# Patient Record
Sex: FEMALE | Race: BLACK OR AFRICAN AMERICAN
[De-identification: names, ages, dates, MRNs, and addresses within clinical notes are randomized per-mention and may not be internally consistent; named-entity substitution may affect disease eponyms.]

---

## 2022-07-29 ENCOUNTER — HOSPITAL ENCOUNTER (EMERGENCY)
Dept: HOSPITAL 97 - ER | Age: 57
Discharge: LEFT BEFORE DISCHARGE | End: 2022-07-29
Payer: COMMERCIAL

## 2022-07-29 DIAGNOSIS — Z02.9: Primary | ICD-10-CM

## 2022-07-29 NOTE — ER
Nurse's Notes                                                                                     

 [facility]                                                                 

Name: Shadi Fall                                                                               

Age: 56 yrs                                                                                       

Sex: Female                                                                                       

: 1965                                                                                   

MRN: [id number]                                                                                   

Arrival Date: 2022                                                                          

Time: 06:43                                                                                       

Account#: [id number]                                                                            

Bed Waiting                                                                                       

Private MD:                                                                                       

Diagnosis:                                                                                        

                                                                                                  

Assessment:                                                                                       

                                                                                             

06:55 Reassessment: Pt told registration they were going to go to urgent care instead.        vc1 

                                                                                                  

ED Course:                                                                                        

06:43 Patient arrived in ED.                                                                  bp1 

                                                                                                  

Administered Medications:                                                                         

No medications were administered                                                                  

                                                                                                  

                                                                                                  

Outcome:                                                                                          

06:56 Patient left the ED.                                                                    vc1 

                                                                                                  

Signatures:                                                                                       

Shania Segovia                           bp1                                                  

Vanessa Aguirre, RN                    RN   vc1                                                  

                                                                                                  

**************************************************************************************************

## 2023-04-25 ENCOUNTER — HOSPITAL ENCOUNTER (EMERGENCY)
Dept: HOSPITAL 97 - ER | Age: 58
Discharge: HOME | End: 2023-04-25
Payer: COMMERCIAL

## 2023-04-25 VITALS — TEMPERATURE: 98.5 F

## 2023-04-25 VITALS — OXYGEN SATURATION: 98 % | SYSTOLIC BLOOD PRESSURE: 141 MMHG | DIASTOLIC BLOOD PRESSURE: 87 MMHG

## 2023-04-25 DIAGNOSIS — R33.9: Primary | ICD-10-CM

## 2023-04-25 DIAGNOSIS — Z98.890: ICD-10-CM

## 2023-04-25 PROCEDURE — 81003 URINALYSIS AUTO W/O SCOPE: CPT

## 2023-04-25 PROCEDURE — 99281 EMR DPT VST MAYX REQ PHY/QHP: CPT

## 2023-04-25 NOTE — ER
Nurse's Notes                                                                                     

 [facility]                                                                 

Name: Shadi Fall                                                                               

Age: 57 yrs                                                                                       

Sex: Female                                                                                       

: 1965                                                                                   

MRN: [id number]                                                                                   

Arrival Date: 2023                                                                          

Time: 14:15                                                                                       

Account#: [id number]                                                                            

Bed 3                                                                                             

Private MD:                                                                                       

Diagnosis: Retention of urine, unspecified                                                        

                                                                                                  

Presentation:                                                                                     

                                                                                             

14:46 Chief complaint: Chief complaint: Patient states: she had a echo seal procedure on      ap3 

      23, and her urinary catheter was removed yesterday 2023. patient         

      states that at first she was able to urinate, however now she can not, and she feels a      

      lot of pressure.                                                                            

14:46 Coronavirus screen: At this time, the client does not indicate any symptoms associated  ap3 

      with coronavirus-19. Ebola Screen: No symptoms or risks identified at this time.            

      Initial Sepsis Screen: Does the patient meet any 2 criteria? HR > 90 bpm. Does the          

      patient have a suspected source of infection? Yes: Dysuria/Frequency/Urgency/UTI. Risk      

      Assessment: Do you want to hurt yourself or someone else? Patient reports no desire to      

      harm self or others. Onset of symptoms was 2023.                                  

14:46 Method Of Arrival: Ambulatory                                                           ap3 

14:46 Acuity: MICHELA 3                                                                           ap3 

                                                                                                  

Triage Assessment:                                                                                

14:49 General: Appears uncomfortable, Behavior is calm, cooperative, appropriate for age.     ap3 

      Pain: Complains of pain in suprapubic area, right lower quadrant and left lower             

      quadrant Quality of pain is described as pressure. Pain: Pain currently is 9 out of 10      

      on a pain scale. Pain began gradually. Neuro: Level of Consciousness is awake, alert,       

      obeys commands, Oriented to person, place, time, situation. Cardiovascular: Patient's       

      skin is warm and dry. Respiratory: Airway is patent Respiratory effort is even,             

      unlabored, Respiratory pattern is regular, symmetrical. : Reports inability to void,      

      since progressively got worse this morning.                                                 

                                                                                                  

Historical:                                                                                       

- Allergies:                                                                                      

14:49 No Known Allergies;                                                                     ap3 

- PMHx:                                                                                           

14:49 None;                                                                                   ap3 

- PSHx:                                                                                           

14:49 bladder lift; hysterectomy-;                                                        ap3 

                                                                                                  

- Immunization history:: Client reports receiving the 2nd dose of the Covid vaccine.              

- Social history:: Smoking status: Patient denies any tobacco usage or history of.                

                                                                                                  

                                                                                                  

Screenin:51 Abuse screen: Denies threats or abuse. Nutritional screening: No deficits noted.        ap3 

      Tuberculosis screening: No symptoms or risk factors identified.                             

                                                                                                  

Assessment:                                                                                       

17:59 Reassessment: Patient appears in no apparent distress at this time. Patient and/or      iw  

      family updated on plan of care and expected duration. Pain level reassessed. Patient is     

      alert, oriented x 3, equal unlabored respirations, skin warm/dry/pink.                      

                                                                                                  

Vital Signs:                                                                                      

14:46  / 105; Pulse 121; Resp 17; Temp 98.5; Pulse Ox 100% ; Weight 72.57 kg; Height 5  ap3 

      ft. 5 in. ; Pain 9/10;                                                                      

17:59  / 87; Pulse 97; Resp 16; Pulse Ox 98% ;                                          iw  

14:46 Body Mass Index 26.63 (72.57 kg, 165.1 cm)                                              ap3 

14:46 Pain Scale: Adult                                                                       ap3 

                                                                                                  

ED Course:                                                                                        

14:17 Patient arrived in ED.                                                                  rg4 

14:34 Daniel Barry DO is Attending Physician.                                                ms3 

14:49 Triage completed.                                                                       ap3 

14:50 Arm band placed on right wrist.                                                         ap3 

17:58 Marilyn Martinez, RN is Primary Nurse.                                                   iw  

17:59 Patient did not have IV access during this emergency room visit.                        iw  

                                                                                                  

Administered Medications:                                                                         

No medications were administered                                                                  

                                                                                                  

                                                                                                  

Medication:                                                                                       

17:59 VIS not applicable for this client.                                                     iw  

                                                                                                  

Outcome:                                                                                          

17:32 Discharge ordered by MD.                                                                ms3 

18:00 Patient left the ED.                                                                    iw  

                                                                                                  

Signatures:                                                                                       

Marilyn Mratinez, JED ADKINS   iw                                                   

Perla Murphy                                 rg4                                                  

Nery Lawrence RN RN   ap3                                                  

Daniel Barry DO                        DO   ms3                                                  

                                                                                                  

Corrections: (The following items were deleted from the chart)                                    

14:49 14:46 Chief complaint: ap3                                                              ap3 

                                                                                                  

**************************************************************************************************

## 2023-04-25 NOTE — XMS REPORT
Continuity of Care Document

                            Created on:2023



Patient:LUIS M HELTON

Sex:Female

:1965

External Reference #:[id number]





Demographics







                          Address                   [address]



                                                    [place] [address]

 

                          Home Phone                [phone]

 

                          Mobile Phone              [phone]

 

                          Email Address             [email or web address]

 

                          Preferred Language        English

 

                          Marital Status            Unknown

 

                          Holiness Affiliation     Unknown

 

                          Race                      Unknown

 

                          Additional Race(s)        Unavailable



                                                    Unavailable



                                                    Black or 

 

                          Ethnic Group              Unknown









Author







                          Organization              [facility]

t

 

                          Address                   [address]



                                                    [place] [address]

 

                          Phone                     [phone]









Support







                Name            Relationship    Address         Phone

 

                RAYNE COLLIER               Unavailable     [phone]

 

                PIYUSH HELTON  P               [phone]      [phone]



                                                [place] [id number] 

 

                SID STANFORD MOM             [address]      [phone]

[phone]



                                                [place] [address] 

 

                3               Unavailable     [address] Unavailable



                                                [place] [address] 

 

                1               [place]         [address]      Unavailable



                                                [place] [address] 

 

                2               [place]         [address]      Unavailable



                                                [place] [address] 









Care Team Providers







                    Name                Role                Phone

 

                    Pcp MD, Jannie          Primary Care Physician Unavailable

 

                    RAKESH TELLEZ Attending Clinician Unavailable

 

                    MUSTAPHA LUCIA  Attending Clinician Unavailable

 

                    DELIA GREEN  Attending Clinician Unavailable

 

                    PAT ADKINS  Attending Clinician Unavailable

 

                    RIVAS KEARNS Attending Clinician Unavailable

 

                    MUSTAPHA LUCIA Attending Clinician Unavailable

 

                    Mustapha Lucia MD Attending Clinician Unavailable

 

                    Dulce Maria Rausch MD  Attending Clinician [phone]

 

                    Alex Chavez Attending Clinician [phone]

 

                    RAYNE GTZ       Attending Clinician Unavailable

 

                    MILA HUERTA     Attending Clinician Unavailable

 

                    [facility]    Attending Clinician Unavailable

 

                    LYNN WASHINGTON       Attending Clinician Unavailable

 

                    MAC, EKG-           Attending Clinician Unavailable

 

                    LAKESHA MACIAS Attending Clinician Unavailable

 

                    LAB[facility]               Attending Clinician Unavailable

 

                    JAMIA SIDHU     Attending Clinician Unavailable

 

                    RANDEE WERNER Attending Clinician Unavailable

 

                    MD DOTTIE  Attending Clinician Unavailable

 

                    CLAIRE SCHUSTER  Attending Clinician Unavailable

 

                    Corrina Wagoner MD Attending Clinician [phone]

[phone]

 

                    CORRINA WAGONER Attending Clinician Unavailable

 

                    Jonathan Nicole DO    Attending Clinician [phone]

 

                    SHYANNE CARVAJAL  Attending Clinician Unavailable

 

                    REJI ENGLISH Attending Clinician Unavailable

 

                    Mary Prince MD Attending Clinician [phone]

 

                    MARY PRINCE   Attending Clinician Unavailable

 

                    MARTA CADENA      Attending Clinician Unavailable

 

                    Rayne Gtz MD    Attending Clinician [phone]

 

                    JEREMY FIGUEROA       Attending Clinician Unavailable

 

                    Clarissa Shipley MD Attending Clinician [phone]

 

                    JESUS ORDONEZ       Attending Clinician Unavailable

 

                    Mp Martin MD Attending Clinician [phone]

 

                    LOYD HANDLEY        Attending Clinician Unavailable

 

                    MUSTAPHA LUCIA Admitting Clinician Unavailable









Payers







           Payer Name Policy Type Policy Number Effective Date Expiration Date S

ko

 

           [facility]          [id number] 2019            



           [place]                       00:00:00              



           CAPITATED)                                             

 

           [facility]              [id number] 2019            



           BLUE/ESSENTIALS                       00:00:00              

 

           [facility]            [id number] 2016            



                                            00:00:00              







Problems







       Condition Condition Condition Status Onset  Resolution Last   Treating Co

mments 

Source



       Name   Details Category        Date   Date   Treatment Clinician        



                                                 Date                 

 

       Cystocele Cystocele Disease Active                              [facility]



       with   with                 4[facility]20                               [facility]



       prolapse prolapse               00:00:                             [facility]

l



                                   00                                 Center

 

       Synovial Synovial Disease Active                              [facility]

y



       cyst of cyst of               3-28                               [facility]



       left   left                 00:00:                             -



       popliteal popliteal               00                                 Exte

rna



       space  space                                                   l

 

       Chondromal Chondromal Disease Active                              K

el[facility]



       acia   acia                 3-28                               [facility]



       patellae patellae               00:00:                             -



       of left of left               00                                 Extern[facility]



       knee   knee                                                    l

 

       Ocular Ocular Disease Active 2013                             M[facility]a



       migraine migraine               0-08                               [facility]

d



                                   00:00:                             -



                                   00                                 Externa



                                                                      l

 

       Migraine Migraine Disease Active                              [facility]

y



                                   1-30                               [facility]



                                   00:00:                             -



                                   00                                 Externa



                                                                      l

 

       Elevated Elevated Disease Active                              [facility]

y



       blood  blood                1-14                               [facility]



       pressure pressure               00:00:                             -



                                   00                                 Externa



                                                                      l







Allergies, Adverse Reactions, Alerts







       Allergy Allergy Status Severity Reaction(s) Onset  Inactive Treating Comm

ents 

Source



       Name   Type                        Date   Date   Clinician        

 

       NO KNOWN Allergy Active                                           [facility]



       ALLERGIE                                                         



       S                                                              







Social History







           Social Habit Start Date Stop Date  Quantity   Comments   Source

 

           Gender identity 2019-10-24            Identifies as            Martha

 Shari



                      22:57:25              female gender            - External



                                            (finding)             

 

           Sexual orientation 2019-10-24            Heterosexual            [facility]

lindsay Shannonybmuna



                      22:57:25              (finding)             - External

 

           History SDOH                                             Martha aguilar



           Alcohol Frequency                                             - Exter

nal

 

           History SDOH                                             Martha aguilar



           Alcohol Std Drinks                                             - Exte

rnal

 

           History SDOH                                             Martha aguilar



           Alcohol Binge                                             - External

 

           Exposure to 2023-04-10 2023 Not sure              [facility]



           SARS-CoV-2 (event) 00:00:00   06:09:00                         [facility]

[facility]

 

           Alcohol intake 2023 Ex-drinker            [facility]

es



                      00:00:00   00:00:00   (finding)             [facility]

 

           Tobacco use and 2023 Smokeless tobacco            [facility]

roney [facility]



           exposure   00:00:00   00:00:00   non-user              - External

 

           History of Social 2022                       Martha [facility]



           function   00:00:00   00:00:00                         - External

 

           Alcohol Comment 2019 OCCASIONAL : Q 3            Gigi vazquez [facility]



                      00:00:00   00:00:00   WEEKS                 - External

 

           Sex Assigned At 1965 F                     POONAM Jeffrey



           Birth      00:00:00   00:00:00                         [facility]









                Smoking Status  Start Date      Stop Date       Source

 

                Never smoked tobacco                                 [facility]







Medications







       Ordered Filled Start  Stop   Current Ordering Indication Dosage Frequency

 Signature

                    Comments            Components          Source



     Medication Medication Date Date Medication? Clinician                (SIG) 

          



     Name Name                                                   

 

     Multiple            Yes                      Take by           Martha



     Vitamins-Mi      4-24                               mouth           [facility]



     nerals      15:20:                                              -



     (MULTIVITAM      29                                                Externa



     IN ADULTS                                                        l



     OR)                                                         

 

     omeprazole            Yes            20mg QD   Take 1           [facility]

t



     (PriLOSEC)      4-22                               capsule           [facility]



     20 MG      14:15:                               (20 mg           Medical



     capsule      01                                 total) by           Center



                                                  mouth in           



                                                  the            



                                                  morning.           

 

     rizatriptan            Yes            10mg      Take 1           [facility]



     (MAXALT-MLT      4-22                               tablet (10           [facility]

kes



     ) 10 MG      14:15:                               mg total)           Med[facility]

al



     disintegrat      01                                 by mouth           Cent

er



     ing tablet                                         as needed           



                                                  for            



                                                  Migraine           



                                                  May repeat           



                                                  in 2 hours           



                                                  if needed.           

 

     hydrOXYzine      0      Yes            25mg      Take 1           [facility]



     (ATARAX) 25      4-22                               tablet (25           [facility]

kes



     MG tablet      14:15:                               mg total)           Med

ical



               01                                 by mouth 3           Center



                                                  (three)           



                                                  times           



                                                  daily as           



                                                  needed for           



                                                  Itching.           

 

     albuterol      -0      Yes            .63mg      Take 3 mLs           C

HI St



     (ACCUNEB)      4-21                               (0.63 mg           [facility]



     0.63 mg/3      14:15:                               total) by           Med

ical



     mL        53                                 nebulizati           Center



     nebulizer                                         on every 6           



     solution                                         (six)           



                                                  hours as           



                                                  needed for           



                                                  Wheezing.           

 

     azithromyci      0      Yes            500mg QD   Take 1           [facility]



     n         4-21                               tablet           [facility]



     (ZITHROMAX)      14:15:                               (500 mg           Med

ical



     500 MG      53                                 total) by           Center



     tablet                                         mouth in           



                                                  the            



                                                  morning.           

 

     codeine-gua      0      Yes            5mL       Take 5 mLs           

[facility]



     ifenesin      4-21                               by mouth 3           [facility]



     (GUAIFENESI      14:15:                               (three)           Med

ical



     N AC)      53                                 times           Center



      mg/5                                         daily as           



     mL liquid                                         needed for           



                                                  Cough.           

 

     predniSONE      0      Yes            1mg  QD   Take 1           [facility]

t



     (DELTASONE)      4-21                               tablet (1           [facility]

es



     1 MG tablet      14:15:                               mg total)           M

edical



               53                                 by mouth           Center



                                                  in the           



                                                  morning.           

 

     Acetaminoph      0      Yes            1{tbl} Q4H  Take 1           Ke

lsey



     en-Codeine      4-21                               tablet by           [facility]



     (TYLENOL/CO      00:00:                               mouth           -



     DEINE #3)      00                                 every 4           Externa



     300-30 MG                                         hours as           l



     oral Tablet                                         needed for           



                                                  pain           

 

     Docusate      -0      Yes       [id number] 100mg      Take 1           Ke

lsey



     Sodium 100      4-21                               capsule           [facility]

d



     MG oral      00:00:                               (100 mg           -



     Capsule      00                                 total) by           Externa



                                                  mouth 2           l



                                                  times           



                                                  daily           

 

     TRIMETHOPRI      -0 - Yes            1{tbl}      Take 1           K

elsey



     M-SULFAMETH      4-21 04-27                          tablet by           Se mendoza



     OXAZOLE      00:00: 04:59                          mouth           -



     (Bactrim      00   :00                           every 12           Externa



     DS) 800-160                                         hours for           l



     MG oral                                         5 days           



     Tablet                                                        

 

     traZODone      2023- No             100mg QD   Take 1           [facility]



     (DESYREL)      4-20 04-20                          tablet           [facility]



     100 MG      06:09: 00:00                          (100 mg           Medical



     tablet      34   :00                           total) by           Center



                                                  mouth           



                                                  nightly.           

 

     Omeprazole            Yes            20mg      Take 1           [facility]

y



     20 MG oral      4-17                               capsule           [facility]

d



     Delayed      00:00:                               (20 mg           -



     Release      00                                 total) by           Externa



     Capsule                                         mouth           l



                                                  daily           

 

     hydroCHLORO            Yes       [id number]           TAKE 1           

Martha



     thiazide      4-11                               CAPSULE(12           [facility]

ld



     12.5 MG      00:00:                               .5 MG) BY           -



     oral      00                                 MOUTH           Externa



     Capsule                                         DAILY           l

 

     IRON OR      2023- No                       Take by           Martha



               4-10 04-10                          mouth           [facility]



               15:29: 00:00                                         -



               37   :00                                          Externa



                                                                 l

 

     Multiple            Yes                      Take by           [facility]a



     Vitamins-Mi      4-10                               mouth           [facility]



     nerals      15:29:                                              -



     (MULTIVITAM      36                                                Externa



     IN ADULTS                                                        l



     OR)                                                         

 

     hydroCHLORO            Yes       [id number] 12.5mg      Take 1         

  Martha



     thiazide      4-10                               capsule           [facility]



     12.5 MG      00:00:                               (12.5 mg           -



     oral      00                                 total) by           Externa



     Capsule                                         mouth           l



                                                  daily           

 

     Multiple            Yes                      Take by           Martha



     Vitamins-Mi      4-06                               mouth           [facility]



     nerals      10:37:                                              -



     (MULTIVITAM      18                                                Externa



     IN ADULTS                                                        l



     OR)                                                         

 

     IRON OR            Yes                      Take by           Martha



               4-06                               mouth           [facility]



               10:37:                                              -



               18                                                Externa



                                                                 l

 

     Multiple            Yes                      Take by           Martha



     Vitamins-Mi      3-28                               mouth           [facility]



     nerals      13:33:                                              -



     (MULTIVITAM      31                                                Externa



     IN ADULTS                                                        l



     OR)                                                         

 

     IRON OR            Yes                      Take by           Martha



               3-28                               mouth           [facility]



               13:33:                                              -



               31                                                Externa



                                                                 l

 

     Multiple            Yes                      Take by           Martha



     Vitamins-Mi      1-20                               mouth           [facility]



     nerals      10:29:                                              -



     (MULTIVITAM      03                                                Externa



     IN ADULTS                                                        l



     OR)                                                         

 

     IRON OR            Yes                      Take by           Martha



               1-20                               mouth           [facility]



               10:29:                                              -



               03                                                Externa



                                                                 l

 

     Multiple      2022      Yes                      Take by           Martha



     Vitamins-Mi      0-11                               mouth           [facility]



     nerals      13:50:                                              -



     (MULTIVITAM      07                                                Externa



     IN ADULTS                                                        l



     OR)                                                         

 

     IRON OR      2022      Yes                      Take by           Martha



               0-11                               mouth           [facility]



               13:50:                                              -



               07                                                Externa



                                                                 l

 

     Docusate      2022      Yes       [id number] 100mg      Take 1           [facility]

lsey



     Sodium 100      0-11                               capsule           [facility]

d



     MG oral      00:00:                               (100 mg           -



     Capsule      00                                 total) by           Externa



                                                  mouth 2           l



                                                  times           



                                                  daily           

 

     Polyethylen      2022      Yes       [id number] 17g       Take 17 g        

   [facility]



     e Glycol      0-11                               by mouth           [facility]



     3350      00:00:                               daily           -



     (MiraLax)      00                                                [facility]



     17 g oral                                                        l



     Pack                                                        

 

     Lidocaine 5      2022      Yes       [id number] 1{patch      Place 1      

     [facility]



     % apply      0-11                     }         patch onto           [facility]

d



     externally      00:00:                               the skin           -



     Patch      00                                 every 24           [facility]



                                                  hours           l



                                                  Remove &           



                                                  Discard           



                                                  patch           



                                                  within 12           



                                                  hours or           



                                                  as             



                                                  directed           



                                                  by MD Hall      2022      Yes       [id number] 100mg      Take 1           [facility]

[facility]



     Sodium 100      0-11                               capsule           [facility]

d



     MG oral      00:00:                               (100 mg           -



     Capsule      00                                 total) by           [facility]tern[facility]



                                                  mouth 2           l



                                                  times           



                                                  daily           

 

     Polyethylen      2022      Yes       [id number] 17g       Take 17 g        

   [facility]



     e Glycol      0-11                               by mouth           [facility]



     3350      00:00:                               daily           -



     (MiraLax)      00                                                Extern[facility]



     17 g oral                                                        l



     Pack                                                        

 

     Lidocaine 5      2022      Yes       [id number]           UNWRAP AND      

     [facility]



     % apply      0-11                               APPLY 1           [facility]



     externally      00:00:                               PATCH TO           -



     Patch      00                                 SKIN EVERY           Externa



                                                  24 HOURS;           l



                                                  REMOVE AND           



                                                  DISCARD           



                                                  PATCH           



                                                  WITHIN 12           



                                                  HOURS OR           



                                                  AS             



                                                  DIRECTED           



                                                  BY MD Hall      2022      Yes       [id number] 100mg      Take 1           [facility]

lsey



     Sodium 100      0-11                               capsule           [facility]

d



     MG oral      00:00:                               (100 mg           -



     Capsule      00                                 total) by           [facility]tern[facility]



                                                  mouth 2           l



                                                  times           



                                                  daily           

 

     Polyethylen      2022      Yes       [id number] 17g       Take 17 g        

   [facility]



     e Glycol      0-11                               by mouth           [facility]



     3350      00:00:                               daily           -



     (MiraLax)      00                                                Extern[facility]



     17 g oral                                                        l



     Pack                                                        

 

     Lidocaine 5      2022      Yes       [id number]           UNWRAP AND      

     [facility]



     % apply      0-11                               APPLY 1           [facility]



     externally      00:00:                               PATCH TO           -



     Patch      00                                 SKIN EVERY           Externa



                                                  24 HOURS;           l



                                                  REMOVE AND           



                                                  DISCARD           



                                                  PATCH           



                                                  WITHIN 12           



                                                  HOURS OR           



                                                  AS             



                                                  DIRECTED           



                                                  BY MD Hall      2022      Yes       [id number] 100mg      Take 1           [facility]

lsey



     Sodium 100      0-11                               capsule           [facility]

d



     MG oral      00:00:                               (100 mg           -



     Capsule      00                                 total) by           [facility]



                                                  mouth 2           l



                                                  times           



                                                  daily           

 

     Polyethylen      2022      Yes       [id number] 17g       Take 17 g        

   [facility]



     e Glycol      0-11                               by mouth           [facility]



     3350      00:00:                               daily           -



     (MiraLax)      00                                                Extern[facility]



     17 g oral                                                        l



     Pack                                                        

 

     Lidocaine 2022      Yes       [id number]           UNWRAP AND      

     [facility]



     % apply      0-11                               APPLY 1           [facility]



     externally      00:00:                               PATCH TO           -



     Patch      00                                 SKIN EVERY           Externa



                                                  24 HOURS;           l



                                                  REMOVE AND           



                                                  DISCARD           



                                                  PATCH           



                                                  WITHIN 12           



                                                  HOURS OR           



                                                  AS             



                                                  DIRECTED           



                                                  BY MD Hall      2022      Yes       [id number] 100mg      Take 1           [facility]

lsey



     Sodium 100      0-11                               capsule           [facility]

d



     MG oral      00:00:                               (100 mg           -



     Capsule      00                                 total) by           [facility]



                                                  mouth 2           l



                                                  times           



                                                  daily           

 

     Lidocaine 2022      Yes       [id number]           UNWRAP AND      

     [facility]



     % apply      0-11                               APPLY 1           [facility]



     externally      00:00:                               PATCH TO           -



     Patch      00                                 SKIN EVERY           [facility]tern[facility]



                                                  24 HOURS;           l



                                                  REMOVE AND           



                                                  DISCARD           



                                                  PATCH           



                                                  WITHIN 12           



                                                  HOURS OR           



                                                  AS             



                                                  DIRECTED           



                                                  BY MD           

 

     Lidocaine 2022      Yes       [id number]           UNWRAP AND      

     [facility]



     % apply      0-11                               APPLY 1           [facility]



     externally      00:00:                               PATCH TO           -



     Patch      00                                 SKIN EVERY           Externa



                                                  24 HOURS;           l



                                                  REMOVE AND           



                                                  DISCARD           



                                                  PATCH           



                                                  WITHIN 12           



                                                  HOURS OR           



                                                  AS             



                                                  DIRECTED           



                                                  BY MD Barbour      2022- No        [id number] 17g       Take 17 g       

    [facility]



     e Glycol      0-11 04-10                          by mouth           [facility]

d



     3350      00:00: 00:00                          daily           -



     (MiraLax)      00   :00                                          [facility]



     17 g oral                                                        l



     Pack                                                        

 

     Cephalexin      2022- No        [id number] 500mg      Take 1       

    [facility]



     (Keflex)      8-02 10-11           [id number]           capsule           [facility]

ld



     500 MG oral      00:00: 00:00                          (500 mg           -



     Capsule      00   :00                           total) by           [facility]tern[facility]



                                                  mouth 2           l



                                                  times           



                                                  daily           

 

     Mupirocin      2022- No        [id number]           Apply 1        

   [facility]



     (BACTROBAN)      8-02 10-11           [id number]           applicatio          

 [facility]



     2 % apply      00:00: 00:00                          n              -



     externally      00   :00                           topically           Ex[facility]

rna



     Ointment                                         3 times           l



                                                  daily           

 

     hydrOXYzine      -0      Yes            25mg Q.[id number] Take 1         

  [facility]



     HCl 25 MG      6-13                          [id number] tablet (25         

  [facility]



     oral Tablet      00:00:                          3D   mg total)           -



               00                                 by mouth           [facility]



                                                  every 8           l



                                                  hours as           



                                                  needed for           



                                                  itching           

 

     hydrOXYzine      -0      Yes            25mg Q.[id number] Take 1         

  [facility]



     HCl 25 MG      6-13                          [id number] tablet (25         

  [facility]



     oral Tablet      00:00:                          3D   mg total)           -



               00                                 by mouth           [facility]



                                                  every 8           l



                                                  hours as           



                                                  needed for           



                                                  itching           

 

     hydrOXYzine      -0      Yes            25mg Q.[id number] Take 1         

  [facility]



     HCl 25 MG      6-13                          [id number] tablet (25         

  [facility]



     oral Tablet      00:00:                          3D   mg total)           -



               00                                 by mouth           [facility]



                                                  every 8           l



                                                  hours as           



                                                  needed for           



                                                  itching           

 

     hydrOXYzine      -0      Yes            25mg Q.[id number] Take 1         

  [facility]



     HCl 25 MG      6-13                          [id number] tablet (25         

  [facility]



     oral Tablet      00:00:                          3D   mg total)           -



               00                                 by mouth           [facility]



                                                  every 8           l



                                                  hours as           



                                                  needed for           



                                                  itching           

 

     hydrOXYzine      -0      Yes            25mg Q.[id number] Take 1         

  [facility]



     HCl 25 MG      6-13                          [id number] tablet (25         

  [facility]



     oral Tablet      00:00:                          3D   mg total)           -



               00                                 by mouth           [facility]



                                                  every 8           l



                                                  hours as           



                                                  needed for           



                                                  itching           

 

     hydrOXYzine      -0      Yes            25mg Q.[id number] Take 1         

  [facility]



     HCl 25 MG      6-13                          [id number] tablet (25         

  [facility]



     oral Tablet      00:00:                          3D   mg total)           -



               00                                 by mouth           [facility]



                                                  every 8           l



                                                  hours as           



                                                  needed for           



                                                  itching           

 

     diazePAM      2-0 - No             2mg  Q.25D Take 1           [facility]

y



     (Valium) 2      5-06 10-11                          tablet (2           [facility]

[facility]



     MG oral      00:00: 00:00                          mg total)           -



     Tablet      00   :00                           by mouth           [facility]



                                                  every 6           l



                                                  hours as           



                                                  needed for           



                                                  anxiety           



                                                  Take           



                                                  tablet 30           



                                                  minutes           



                                                  before MRI           

 

     Multiple      -0      Yes                      Take by           [facility]



     Vitamins-Mi      5-03                               mouth           [facility]



     nerals      15:48:                                              



     (MULTIVITAM      41                                                



     IN ADULTS                                                        



     OR)                                                         

 

     IRON OR            Yes                      Take by           [facility]



               5-03                               mouth           [facility]



               15:48:                                              



               41                                                

 

     Rizatriptan            Yes                      May repeat           

[facility]



     Benzoate      5-03                               in 2 hours           [facility]

ld



     (Maxalt) 10      00:00:                               if needed           



     MG oral      00                                                



     Tablet                                                        

 

     Rizatriptan      0      Yes                      May repeat           

[facility]



     Benzoate      5-03                               in 2 hours           [facility]

ld



     (Maxalt) 10      00:00:                               if needed           -



     MG oral      00                                                [facility]tern[facility]



     Tablet                                                        l

 

     Rizatriptan      0      Yes                      May repeat           

[facility]



     Benzoate      5-03                               in 2 hours           [facility]

ld



     (Maxalt) 10      00:00:                               if needed           -



     MG oral      00                                                [facility]tern[facility]



     Tablet                                                        l

 

     Rizatriptan            Yes                      May repeat           

[facility]



     Benzoate      5-03                               in 2 hours           [facility]

ld



     (Maxalt) 10      00:00:                               if needed           -



     MG oral      00                                                Extern[facility]



     Tablet                                                        l

 

     Rizatriptan            Yes                      May repeat           

[facility]



     Benzoate      5-03                               in 2 hours           [facility]

ld



     (Maxalt) 10      00:00:                               if needed           -



     MG oral      00                                                Extern[facility]



     Tablet                                                        l

 

     Rizatriptan      0      Yes                      May repeat           

[facility]



     Benzoate      5-03                               in 2 hours           [facility]

ld



     (Maxalt) 10      00:00:                               if needed           -



     MG oral      00                                                Extern[facility]



     Tablet                                                        l

 

     Rizatriptan      0      Yes                      May repeat           

[facility]



     Benzoate      5-03                               in 2 hours           [facility]

ld



     (Maxalt) 10      00:00:                               if needed           -



     MG oral      00                                                Extern[facility]



     Tablet                                                        l

 

     Rizatriptan      0 - No                       May repeat          

 [facility]



     Benzoate      1-28 05-03                          in 2 hours           [facility]

old



     (Maxalt) 10      00:00: 00:00                          if needed           



     MG oral      00   :00                                          



     Tablet                                                        

 

     Multiple      2021      Yes                      Take by           [facility]



     Vitamins-Mi      1-30                               mouth           [facility]



     nerals      14:04:                                              



     (MULTIVITAM      09                                                



     IN ADULTS                                                        



     OR)                                                         

 

     IRON OR      2021      Yes                      Take by           [facility]



               1-30                               mouth           [facility]



               14:04:                                              



               09                                                

 

     Rizatriptan      2021      Yes                      May repeat           

[facility]



     Benzoate      1-12                               in 2 hours           [facility]

ld



     (Maxalt) 10      00:00:                               if needed           



     MG oral      00                                                



     Tablet                                                        

 

     Multiple            Yes                      Take by           [facility]



     Vitamins-Mi                                     mouth           [facility]



     nerals      10:03:                                              



     (MULTIVITAM      32                                                



     IN ADULTS                                                        



     OR)                                                         

 

     IRON OR            Yes                      Take by           [facility]



                                              mouth           [facility]



               10:03:                                              



               32                                                

 

     Omeprazole      -0      Yes            20mg      Take 1           [facility]

y



     20 MG oral      5-14                               capsule           [facility]

d



     Delayed      00:00:                               (20 mg           



     Release      00                                 total) by           



     Capsule                                         mouth           



                                                  daily           

 

     Omeprazole      -0      Yes            20mg      Take 1           [facility]

y



     20 MG oral      5-14                               capsule           [facility]

d



     Delayed      00:00:                               (20 mg           -



     Release      00                                 total) by           [facility]



     Capsule                                         mouth           l



                                                  daily           

 

     Omeprazole      -0      Yes            20mg      Take 1           [facility]

y



     20 MG oral      5-14                               capsule           [facility]

d



     Delayed      00:00:                               (20 mg           -



     Release                                       total) by           [facility]



     Capsule                                         mouth           l



                                                  daily           

 

     Omeprazole      -0      Yes            20mg      Take 1           [facility]

y



     20 MG oral      5-14                               capsule           [facility]

d



     Delayed      00:00:                               (20 mg           -



     Release                                       total) by           [facility]



     Capsule                                         mouth           l



                                                  daily           

 

     Omeprazole      -0      Yes            20mg      Take 1           [facility]

y



     20 MG oral      5-14                               capsule           [facility]

d



     Delayed      00:00:                               (20 mg           -



     Release                                       total) by           [facility]



     Capsule                                         mouth           l



                                                  daily           

 

     Omeprazole      -0      Yes            20mg      Take 1           [facility]

y



     20 MG oral      5-14                               capsule           [facility]

d



     Delayed      00:00:                               (20 mg           -



     Release                                       total) by           [facility]



     Capsule                                         mouth           l



                                                  daily           

 

     Omeprazole      -0      Yes            20mg      Take 1           [facility]

y



     20 MG oral      5-14                               capsule           [facility]

d



     Delayed      00:00:                               (20 mg           



     Release      00                                 total) by           



     Capsule                                         mouth           



                                                  daily           

 

     Omeprazole      -0      Yes            20mg      Take 1           [facility]

y



     20 MG oral      5-14                               capsule           [facility]

d



     Delayed      00:00:                               (20 mg           



     Release      00                                 total) by           



     Capsule                                         mouth           



                                                  daily           

 

     Fluticasone      -      Yes            1{puff}      Inhale 1          

 [facility]



     -Salmeterol      3-18                               puff into           [facility]

[facility]



     (Advair      00:00:                               the lungs           



     Diskus)      00                                 2 times           



     250-50                                         daily           



     MCG/DOSE                                                        



     inhalation                                                        



     AEROSOL                                                        



     POWDER,                                                        



     BREATH                                                        



     ACTIVATED                                                        

 

     Fluticasone            Yes            1{puff}      Inhale 1          

 [facility]



     -Salmeterol      3-18                               puff into           [facility]

[facility]



     (Advair      00:00:                               the lungs           



     Diskus)      00                                 2 times           



     250-50                                         daily           



     MCG/DOSE                                                        



     inhalation                                                        



     AEROSOL                                                        



     POWDER,                                                        



     BREATH                                                        



     ACTIVATED                                                        

 

     Fluticasone            Yes            1{puff}      Inhale 1          

 [facility]



     -Salmeterol      3-18                               puff into           [facility]

[facility]



     (Advair      00:00:                               the lungs           



     Diskus)      00                                 2 times           



     250-50                                         daily           



     MCG/DOSE                                                        



     inhalation                                                        



     AEROSOL                                                        



     POWDER,                                                        



     BREATH                                                        



     ACTIVATED                                                        

 

     Rizatriptan            Yes                      May repeat           

[facility]



     Benzoate      2-04                               in 2 hours           [facility]

ld



     (Maxalt) 10      00:00:                               if needed           



     MG oral Tab      00                                                

 

     hydrOXYzine      -0      Yes            25mg [id number] Take 1         

  [facility]



     HCl 25 MG      4-27                          [id number] tablet (25         

  [facility]



     oral Tab      00:00:                          3D   mg total)           



               00                                 by mouth           



                                                  every 8           



                                                  hours as           



                                                  needed for           



                                                  itching           

 

     hydrOXYzine      -0      Yes            25mg Q8H  Take 1           [facility]

ey



     HCl 25 MG      4-27                               tablet (25           [facility]

old



     oral Tab      00:00:                               mg total)           



               00                                 by mouth           



                                                  every 8           



                                                  hours as           



                                                  needed for           



                                                  itching           

 

     hydrOXYzine      -0      Yes            25mg Q8H  Take 1           [facility]

ey



     HCl 25 MG      4-27                               tablet (25           [facility]

old



     oral Tab      00:00:                               mg total)           



               00                                 by mouth           



                                                  every 8           



                                                  hours as           



                                                  needed for           



                                                  itching           

 

     albuterol            Yes            1{ampul      Take 1           [facility]



     (ACCUNEB)      7-25                     e}        ampule by           [facility]



     0.63 mg/3      14:18:                               nebulizati           Me

dical



     mL        59                                 on every 6           Center



     nebulizer                                         (six)           



     solution                                         hours as           



                                                  needed for           



                                                  Wheezing.           

 

     azithromyci            Yes            500mg QD   Take 500           [facility]

[facility]



     n         7-25                               mg by           [facility]



     (ZITHROMAX)      14:18:                               mouth           [facility]

al



     500 MG      59                                 daily.           Center



     tablet                                                        

 

     codeine-gua            Yes            5mL       Take 5 mLs           

[facility]



     ifenesin      7-25                               by mouth 3           [facility]



     (GUAIFENESI      14:18:                               (three)           Med

ical



     N AC)      59                                 times           Center



      mg/5                                         daily as           



     mL liquid                                         needed for           



                                                  Cough.           

 

     predniSONE            Yes            1mg  QD   Take 1 mg           [facility]



     (DELTASONE)      7-25                               by mouth           [facility]

s



     1 MG tablet      14:18:                               daily.           Medi

felix



               59                                                [place]

 

     traZODone            Yes            100mg QD   Take 100           [facility]



     (DESYREL)      7-25                               mg by           [facility]



     100 MG      14:18:                               mouth           [facility]



     tablet      59                                 nightly.           [facility]

 

     Albuterol            Yes       [id number] 2{puff} Q.25D Inhale 2    

       [facility]



      (90      7-24                6              (two)           [facility]



     Base)      00:00:                               puffs into           



     MCG/ACT IN      00                                 the lungs           



     AERS                                         every 6           



                                                  hours as           



                                                  needed           

 

     Albuterol            Yes       [id number] 2{puff} Q.25D Inhale 2    

       [facility]



      (90      7-24                6              (two)           [facility]



     Base)      00:00:                               puffs into           -



     MCG/ACT IN      00                                 the lungs           Exte

rna



     AERS                                         every 6           l



                                                  hours as           



                                                  needed           

 

     Albuterol            Yes       [id number] 2{puff} Q.25D Inhale 2    

       [facility]



      (90      7-24                6              (two)           [facility]



     Base)      00:00:                               puffs into           -



     MCG/ACT IN      00                                 the lungs           Exte

rna



     AERS                                         every 6           l



                                                  hours as           



                                                  needed           

 

     Albuterol            Yes       [id number] 2{puff} Q.25D Inhale 2    

       [facility]



      (90      7-24                6              (two)           [facility]



     Base)      00:00:                               puffs into           -



     MCG/ACT IN      00                                 the lungs           Exte

rna



     AERS                                         every 6           l



                                                  hours as           



                                                  needed           

 

     Albuterol      -      Yes       [id number] 2{puff} Q.25D Inhale 2    

       [facility]



      (90      7-24                6              (two)           [facility]



     Base)      00:00:                               puffs into           -



     MCG/ACT IN      00                                 the lungs           Exte

rna



     AERS                                         every 6           l



                                                  hours as           



                                                  needed           

 

     Albuterol      -      Yes       [id number] 2{puff} Q.25D Inhale 2    

       [facility]



      (90      7-24                6              (two)           [facility]



     Base)      00:00:                               puffs into           -



     MCG/ACT IN      00                                 the lungs           Exte

rna



     AERS                                         every 6           l



                                                  hours as           



                                                  needed           

 

     Albuterol      -      Yes       [id number] 2{puff} Q6H  Inhale 2     

      [facility]



      (90      7-24                6              (two)           [facility]



     Base)      00:00:                               puffs into           



     MCG/ACT IN      00                                 the lungs           



     AERS                                         every 6           



                                                  hours as           



                                                  needed           

 

     Albuterol            Yes       [id number] 2{puff} Q.25D Inhale 2    

       [facility]



      (90      7-24                6              (two)           [facility]



     Base)      00:00:                               puffs into           -



     MCG/ACT IN      00                                 the lungs           Exte

rna



     AERS                                         every 6           l



                                                  hours as           



                                                  needed           

 

     Albuterol            Yes       [id number] 2{puff} Q6H  Inhale 2     

      [facility]



      (90      7-24                6              (two)           [facility]



     Base)      00:00:                               puffs into           



     MCG/ACT IN      00                                 the lungs           



     AERS                                         every 6           



                                                  hours as           



                                                  needed           







Immunizations







           Ordered Immunization Filled Immunization Date       Status     Commen

ts   Source



           Name       Name                                        

 

           Shingles IM            2023 Completed             [facility]

d



           (Shingrix)            00:00:00                         - External

 

           Shingles IM            2022-10-11 Completed             [facility]

d



           (Shingrix)            00:00:00                         - External

 

           Influenza Virus            2022-10-11 Completed             [facility]

[facility]



           Vaccine, age 6            00:00:00                         - External



           months and up                                             

 

           Shingles IM            2022-10-11 Completed             [facility]

d



           (Shingrix)            00:00:00                         - External

 

           Influenza Virus            2022-10-11 Completed             [facility]

[facility]



           Vaccine, age 6            00:00:00                         - External



           months and up                                             

 

           Shingles IM            2022-10-11 Completed             [facility]

d



           (Shingrix)            00:00:00                         - External

 

           Influenza Virus            2022-10-11 Completed             [facility]

[facility]



           Vaccine, age 6            00:00:00                         - External



           months and up                                             

 

           Shingles IM            2022-10-11 Completed             [facility]

d



           (Shingrix)            00:00:00                         - External

 

           Influenza Virus            2022-10-11 Completed             [facility] Se

[facility]



           Vaccine, age 6            00:00:00                         - External



           months and up                                             

 

           Shingles IM            2022-10-11 Completed             [facility]

d



           (Shingrix)            00:00:00                         - External

 

           Influenza Virus            2022-10-11 Completed             [facility] Se

[facility]



           Vaccine, age 6            00:00:00                         - External



           months and up                                             

 

           Shingles IM            2022-10-11 Completed             [facility]

d



           (Shingrix)            00:00:00                         - External

 

           Influenza Virus            2022-10-11 Completed             [facility] Se

[facility]



           Vaccine, age 6            00:00:00                         - External



           months and up                                             

 

           Influenza Virus            2021 Completed             [facility]a Se

[facility]



           Vaccine, age 6            00:00:00                         



           months and up                                             

 

           Influenza Virus            2021 Completed             [facility] Se

[facility]



           Vaccine, age 6            00:00:00                         - External



           months and up                                             

 

           Influenza Virus            2021 Completed             [facility] Se

[facility]



           Vaccine, age 6            00:00:00                         - External



           months and up                                             

 

           Influenza Virus            2021 Completed             [facility] Se

[facility]



           Vaccine, age 6            00:00:00                         - External



           months and up                                             

 

           Influenza Virus            2021 Completed             [facility] Se

[facility]



           Vaccine, age 6            00:00:00                         - External



           months and up                                             

 

           Influenza Virus            2021 Completed             [facility]a Se

[facility]



           Vaccine, age 6            00:00:00                         - External



           months and up                                             

 

           Influenza Virus            2021 Completed             M[facility]a Se

[facility]



           Vaccine, age 6            00:00:00                         - External



           months and up                                             

 

           Influenza Virus            2021 Completed             [facility] Se

[facility]



           Vaccine, age 6            00:00:00                         



           months and up                                             

 

           Influenza Virus            2021 Completed             [facility]a Se

[facility]



           Vaccine, age 6            00:00:00                         



           months and up                                             

 

           Covid-19 Vaccine            2021 Completed             [facility]a [facility]ry



           Moderna (Spikevax),            00:00:00                         



           Mrna-lnp, Erwin                                             



           Protein, Pf                                             

 

           Covid-19 Vaccine            2021 Completed             [facility]



           Moderna (Spikevax),            00:00:00                         - Ext

ernal



           Mrna-lnp, Erwin                                             



           Protein, Pf                                             

 

           Covid-19 Vaccine            2021 Completed             [facility]



           Moderna (Spikevax),            00:00:00                         - Ext

ernal



           Mrna-lnp, Erwin                                             



           Protein, Pf                                             

 

           Covid-19 Vaccine            2021 Completed             [facility]



           Moderna (Spikevax),            00:00:00                         - Ext

ernal



           Mrna-lnp, Erwin                                             



           Protein, Pf                                             

 

           Covid-19 Vaccine            2021 Completed             [facility]



           Moderna (Spikevax),            00:00:00                         - Ext

ernal



           Mrna-lnp, Erwin                                             



           Protein, Pf                                             

 

           Covid-19 Vaccine            2021 Completed             [facility]ry



           Moderna (Spikevax),            00:00:00                         - Ext

ernal



           Mrna-lnp, Erwin                                             



           Protein, Pf                                             

 

           Covid-19 Vaccine            2021 Completed             [facility]a guidry



           Moderna (Spikevax),            00:00:00                         - Ext

ernal



           Mrna-lnp, Erwin                                             



           Protein, Pf                                             

 

           Covid-19 Vaccine            2021 Completed             [facility] guidry



           (Moderna), Mrna-lnp,            00:00:00                         



           Erwin Protein, Pf,                                             



           100 Mcg/0.5ml,IM                                             

 

           Covid-19 Vaccine            2021 Completed             [facility]a guidry



           (Moderna), Mrna-lnp,            00:00:00                         



           Erwin Protein, Pf,                                             



           100 Mcg/0.5ml,IM                                             

 

           Covid-19 Vaccine            2021 Completed             [facility]a guidry



           Moderna (Spikevax),            00:00:00                         



           Mrna-lnp, Erwin                                             



           Protein, Pf                                             

 

           Covid-19 Vaccine            2021 Completed             [facility]a guidry



           Moderna (Spikevax),            00:00:00                         - Ext

ernal



           Mrna-lnp, Erwin                                             



           Protein, Pf                                             

 

           Covid-19 Vaccine            2021 Completed             [facility]a guidry



           Moderna (Spikevax),            00:00:00                         - Ext

ernal



           Mrna-lnp, Erwin                                             



           Protein, Pf                                             

 

           Covid-19 Vaccine            2021 Completed             [facility]a guidry



           Moderna (Spikevax),            00:00:00                         - Ext

ernal



           Mrna-lnp, Erwin                                             



           Protein, Pf                                             

 

           Covid-19 Vaccine            2021 Completed             M[facility]a guidry



           Moderna (Spikevax),            00:00:00                         - Ext

ernal



           Mrna-lnp, Erwin                                             



           Protein, Pf                                             

 

           Covid-19 Vaccine            2021 Completed             M[facility]a guidry



           Moderna (Spikevax),            00:00:00                         - Ext

ernal



           Mrna-lnp, Erwin                                             



           Protein, Pf                                             

 

           Covid-19 Vaccine            2021 Completed             [facility]a guidry



           Moderna (Spikevax),            00:00:00                         - Ext

ernal



           Mrna-lnp, Erwin                                             



           Protein, Pf                                             

 

           Covid-19 Vaccine            2021 Completed             Martha S

[facility]



           (Moderna), Mrna-lnp,            00:00:00                         



           Erwin Protein, Pf,                                             



           100 Mcg/0.5ml,IM                                             

 

           Covid-19 Vaccine            2021 Completed             [facility]a [facility]



           (Moderna), Mrna-lnp,            00:00:00                         



           Erwin Protein, Pf,                                             



           100 Mcg/0.5ml,IM                                             

 

           Influenza Virus            2020 Completed             [facility]a Se

[facility]



           Vaccine, age 6            00:00:00                         



           months and up                                             

 

           Influenza Virus            2020 Completed             Martha Se

ybold



           Vaccine, age 5            00:00:00                         - External



           months and up                                             

 

           Influenza Virus            2020 Completed             Martha Se

ybold



           Vaccine, age 5            00:00:00                         - External



           months and up                                             

 

           Influenza Virus            2020 Completed             Martha Se

ybold



           Vaccine, age 5            00:00:00                         - External



           months and up                                             

 

           Influenza Virus            2020 Completed             Martha Se

ybold



           Vaccine, age 5            00:00:00                         - External



           months and up                                             

 

           Influenza Virus            2020 Completed             Martha Se

ybold



           Vaccine, age 5            00:00:00                         - External



           months and up                                             

 

           Influenza Virus            2020 Completed             Martha Se

ybold



           Vaccine, age 5            00:00:00                         - External



           months and up                                             

 

           Influenza Virus            2020 Completed             Martha Se

ybold



           Vaccine, age 5            00:00:00                         



           months and up                                             

 

           Influenza Virus            2020 Completed             Mratha Se

ybold



           Vaccine, age 5            00:00:00                         



           months and up                                             

 

           Influenza Virus            2019 Completed             Martha Se

ybold



           Vaccine, age 5            00:00:00                         



           months and up                                             

 

           Influenza Virus            2019 Completed             Martha Se

ybold



           Vaccine, age 5            00:00:00                         - External



           months and up                                             

 

           Influenza Virus            2019 Completed             Martha Se

ybold



           Vaccine, age 5            00:00:00                         - External



           months and up                                             

 

           Influenza Virus            2019 Completed             Martha Se

ybold



           Vaccine, age 5            00:00:00                         - External



           months and up                                             

 

           Influenza Virus            2019 Completed             Martha Se

ybold



           Vaccine, age 5            00:00:00                         - External



           months and up                                             

 

           Influenza Virus            2019 Completed             Martha Se

ybold



           Vaccine, age 5            00:00:00                         - External



           months and up                                             

 

           Influenza Virus            2019 Completed             Martha Se

ybold



           Vaccine, age 5            00:00:00                         - External



           months and up                                             

 

           Influenza Virus            2019 Completed             Martha Se

ybold



           Vaccine, age 5            00:00:00                         



           months and up                                             

 

           Influenza Virus            2019 Completed             Martha Se

ybold



           Vaccine, age 5            00:00:00                         



           months and up                                             

 

           Influenza Virus            2018 Completed             Martha Se

ybold



           Vaccine, age 5            00:00:00                         



           months and up                                             

 

           Influenza Virus            2018 Completed             Martha Se

ybold



           Vaccine, age 5            00:00:00                         - External



           months and up                                             

 

           Influenza Virus            2018 Completed             Martha Se

ybold



           Vaccine, age 5            00:00:00                         - External



           months and up                                             

 

           Influenza Virus            2018 Completed             Martha Se

ybold



           Vaccine, age 5            00:00:00                         - External



           months and up                                             

 

           Influenza Virus            2018 Completed             Martha Se

ybold



           Vaccine, age 5            00:00:00                         - External



           months and up                                             

 

           Influenza Virus            2018 Completed             Martha Se

ybold



           Vaccine, age 5            00:00:00                         - External



           months and up                                             

 

           Influenza Virus            2018 Completed             Martha Se

ybold



           Vaccine, age 5            00:00:00                         - External



           months and up                                             

 

           Influenza Virus            2018 Completed             Martha Se

ybold



           Vaccine, age 5            00:00:00                         



           months and up                                             

 

           Influenza Virus            2018 Completed             Martha Se

ybold



           Vaccine, age 5            00:00:00                         



           months and up                                             

 

           Influenza Virus            2017-10-26 Completed             Martha Se

ybold



           Vaccine, age 5            00:00:00                         



           months and up                                             

 

           Influenza Virus            2017-10-26 Completed             Martha Se

ybold



           Vaccine, age 5            00:00:00                         - External



           months and up                                             

 

           Influenza Virus            2017-10-26 Completed             Martha Se

ybold



           Vaccine, age 5            00:00:00                         - External



           months and up                                             

 

           Influenza Virus            2017-10-26 Completed             Martha Se

ybold



           Vaccine, age 5            00:00:00                         - External



           months and up                                             

 

           Influenza Virus            2017-10-26 Completed             Martha Se

ybold



           Vaccine, age 5            00:00:00                         - External



           months and up                                             

 

           Influenza Virus            2017-10-26 Completed             Martha Se

ybold



           Vaccine, age 5            00:00:00                         - External



           months and up                                             

 

           Influenza Virus            2017-10-26 Completed             Martha Se

ybold



           Vaccine, age 5            00:00:00                         - External



           months and up                                             

 

           Influenza Virus            2017-10-26 Completed             Martha Se

ybold



           Vaccine, age 5            00:00:00                         



           months and up                                             

 

           Influenza Virus            2017-10-26 Completed             Martha Se

ybold



           Vaccine, age 5            00:00:00                         



           months and up                                             

 

           MMR- Measles, Mumps,            2016 Completed             Liv

ey Seybold



           Rubella               00:00:00                         

 

           MMR- Measles, Mumps,            2016 Completed             Liv

ey Seybold



           Rubella               00:00:00                         - External

 

           MMR- Measles, Mumps,            2016 Completed             Liv

ey Seybold



           Rubella               00:00:00                         - External

 

           MMR- Measles, Mumps,            2016 Completed             Liv

ey Seybold



           Rubella               00:00:00                         - External

 

           MMR- Measles, Mumps,            2016 Completed             Liv

ey Seybold



           Rubella               00:00:00                         - External

 

           MMR- Measles, Mumps,            2016 Completed             Liv

ey Seybold



           Rubella               00:00:00                         - External

 

           MMR- Measles, Mumps,            2016 Completed             Liv

ey Seybold



           Rubella               00:00:00                         - External

 

           MMR- Measles, Mumps,            2016 Completed             Liv

ey Seybold



           Rubella               00:00:00                         

 

           MMR- Measles, Mumps,            2016 Completed             Liv

ey Seybold



           Rubella               00:00:00                         

 

           Tdap- (Boostrix,            2016 Completed             Martha S

eybold



           Adacel)               00:00:00                         

 

           Tdap- (Boostrix,            2016 Completed             Martha S

eybold



           Adacel)               00:00:00                         - External

 

           Tdap- (Boostrix,            2016 Completed             Martha S

eybold



           Adacel)               00:00:00                         - External

 

           Tdap- (Boostrix,            2016 Completed             Martha S

eybold



           Adacel)               00:00:00                         - External

 

           Tdap- (Boostrix,            2016 Completed             Martha S

eybold



           Adacel)               00:00:00                         - External

 

           Tdap- (Boostrix,            2016 Completed             Martha S

eybold



           Adacel)               00:00:00                         - External

 

           Tdap- (Boostrix,            2016 Completed             Martha S

eybold



           Adacel)               00:00:00                         - External

 

           Tdap- (Boostrix,            2016 Completed             Martha S

eybold



           Adacel)               00:00:00                         

 

           Tdap- (Boostrix,            2016 Completed             Martha MCCONNELL

eybold



           Adacel)               00:00:00                         

 

           Influenza Virus            2015-10-02 Completed             Martha Se

ybold



           Vaccine, age 5            00:00:00                         



           months and up                                             

 

           Influenza Virus            2015-10-02 Completed             Martha Se

ybold



           Vaccine, age 5            00:00:00                         - External



           months and up                                             

 

           Influenza Virus            2015-10-02 Completed             Martha Se

ybold



           Vaccine, age 5            00:00:00                         - External



           months and up                                             

 

           Influenza Virus            2015-10-02 Completed             Martha Se

ybold



           Vaccine, age 5            00:00:00                         - External



           months and up                                             

 

           Influenza Virus            2015-10-02 Completed             Martha Se

ybold



           Vaccine, age 5            00:00:00                         - External



           months and up                                             

 

           Influenza Virus            2015-10-02 Completed             Martha Se

ybold



           Vaccine, age 5            00:00:00                         - External



           months and up                                             

 

           Influenza Virus            2015-10-02 Completed             Martha Se

ybold



           Vaccine, age 5            00:00:00                         - External



           months and up                                             

 

           Influenza Virus            2015-10-02 Completed             Martha Se

ybold



           Vaccine, age 5            00:00:00                         



           months and up                                             

 

           Influenza Virus            2015-10-02 Completed             Martha Se

ybold



           Vaccine, age 5            00:00:00                         



           months and up                                             

 

           Influenza Virus            2013 Completed             Martha Se

ybold



           Vaccine, age 5            00:00:00                         



           months and up                                             

 

           Influenza Virus            2013 Completed             Martha Se

ybold



           Vaccine, age 5            00:00:00                         - External



           months and up                                             

 

           Influenza Virus            2013 Completed             Martha Se

ybold



           Vaccine, age 5            00:00:00                         - External



           months and up                                             

 

           Influenza Virus            2013 Completed             Martha Se

ybold



           Vaccine, age 5            00:00:00                         - External



           months and up                                             

 

           Influenza Virus            2013 Completed             Martha Se

ybold



           Vaccine, age 5            00:00:00                         - External



           months and up                                             

 

           Influenza Virus            2013 Completed             Martha Se

ybold



           Vaccine, age 5            00:00:00                         - External



           months and up                                             

 

           Influenza Virus            2013 Completed             Martha Se

ybold



           Vaccine, age 5            00:00:00                         - External



           months and up                                             

 

           Influenza Virus            2013 Completed             Martha Se

ybold



           Vaccine, age 5            00:00:00                         



           months and up                                             

 

           Influenza Virus            2013 Completed             Martha Se

ybold



           Vaccine, age 5            00:00:00                         



           months and up                                             

 

           Tdap- (Boostrix,            2006-10-12 Completed             Martha S

eybold



           Adacel)               00:00:00                         

 

           Tdap- (Boostrix,            2006-10-12 Completed             Martha S

eybold



           Adacel)               00:00:00                         - External

 

           Tdap- (Boostrix,            2006-10-12 Completed             Martha S

eybold



           Adacel)               00:00:00                         - External

 

           Tdap- (Boostrix,            2006-10-12 Completed             Martha S

eybold



           Adacel)               00:00:00                         - External

 

           Tdap- (Boostrix,            2006-10-12 Completed             Martha S

eybold



           Adacel)               00:00:00                         - External

 

           Tdap- (Boostrix,            2006-10-12 Completed             Martha S

eybold



           Adacel)               00:00:00                         - External

 

           Tdap- (Boostrix,            2006-10-12 Completed             Martha S

eybold



           Adacel)               00:00:00                         - External

 

           Tdap- (Boostrix,            2006-10-12 Completed             Martha S

eybold



           Adacel)               00:00:00                         

 

           Tdap- (Boostrix,            2006-10-12 Completed             Martha S

eybold



           Adacel)               00:00:00                         







Vital Signs







             Vital Name   Observation Time Observation Value Comments     Source

 

             Systolic blood 2023   114 mm[Hg]                Martha Shannondahliaol

d



             pressure     19:25:00                               - External

 

             Diastolic blood 2023   80 mm[Hg]                 Martha Shannondahliao

ld



             pressure     19:25:00                               - External

 

             [facility]   2023   135 [facility]                  [facility]



                          :[facility]00                               - External

 

             Body temperature 2023   36.11 Milly                 Martha Seyb

old



                          :25:00                               - External

 

             Respiratory rate 2023   18 /min                   Martha Shannon

old



                          :25:00                               - External

 

             Body height  2023   165.1 cm                  Martha [facility]



                          :25:00                               - External

 

             Body weight  2023   72.576 kg                 [facility]



                          :25:00                               - External

 

             BMI          2023   26.63 kg/m2               [facility]



                          :25:00                               - External

 

             HEIGHT       2023   165.1 cm                  



                          06:12:00                               

 

             WEIGHT       2023   73.483 kg                 



                          06:12:00                               

 

             HEIGHT       2023   165.1 cm                  



                          12:35:00                               

 

             WEIGHT       2023   72.576 kg                 



                          12:35:00                               

 

             HEIGHT       2023   165.1 cm                  



                          06:12:00                               

 

             WEIGHT       2023   73.483 kg                 



                          06:12:00                               

 

             HEIGHT       2023   165.1 cm                  



                          12:35:00                               

 

             WEIGHT       2023   72.576 kg                 



                          12:35:00                               

 

             HEIGHT       2023   165.1 cm                  



                          06:12:00                               

 

             WEIGHT       2023   73.483 kg                 



                          06:12:00                               

 

             HEIGHT       2023   165.1 cm                  



                          12:35:00                               

 

             WEIGHT       2023   72.576 kg                 



                          12:35:00                               

 

             Systolic blood 2023-04-10   142 mm[Hg]                Martha Seybol

d



             pressure     20:28:00                               - External

 

             Diastolic blood 2023-04-10   96 mm[Hg]                 Martha Seybo

ld



             pressure     20:28:00                               - External

 

             Heart rate   2023-04-10   98 /min                   Martha Seybold



                          20:28:00                               - External

 

             Body temperature 2023-04-10   36.61 Milly                 Martha Shannonyb

old



                          20:28:00                               - External

 

             Respiratory rate 2023-04-10   18 /min                   Martha Shannonyb

old



                          20:28:00                               - External

 

             Body height  2023-04-10   165.1 cm                  Martha Seybold



                          20:28:                               - External

 

             Body weight  2023-04-10   72.576 kg                 Martha Seybold



                          20:28:00                               - External

 

             BMI          2023-04-10   26.63 kg/m2               Martha Seybold



                          20:28:00                               - External

 

             Systolic blood 2023   141 mm[Hg]   Rechecked    Martha Seybol

d



             pressure     15:49:00                  vitals MD   - External



                                                    notified pt  



                                                    denies headache, 



                                                    dizziness left 



                                                    in no distress. 

 

             Diastolic blood 2023   92 mm[Hg]    Rechecked    Martha Seybo

ld



             pressure     15:49:00                  vitals, MD   - External



                                                    notified pt  



                                                    denies headache, 



                                                    dizziness left 



                                                    in no distress. 

 

             Heart rate   2023   100 /min                  Martha Seybold



                          15:49:00                               - External

 

             Body weight  2023   72.938 kg                 Martha Seybold



                          15:35:00                               - External

 

             BMI          2023   26.76 kg/m2               Martha Seybold



                          15:35:00                               - External

 

             Systolic blood 2023   140 mm[Hg]                Martha Seybol

d



             pressure     18:33:00                               - External

 

             Diastolic blood 2023   90 mm[Hg]                 Martha Seybo

ld



             pressure     18:33:00                               - External

 

             Heart rate   2023   74 /min                   Martha Seybold



                          18:33:00                               - External

 

             Body temperature 2023   36.67 Milly                 Martha Shannonyb

old



                          18:33:00                               - External

 

             Respiratory rate 2023   18 /min                   Martha Shannonyb

old



                          18:33:00                               - External

 

             Body height  2023   165.1 cm                  Martha Seybold



                          18:33:00                               - External

 

             Body weight  2023   73.483 kg                 Martha Seybold



                          18:33:00                               - External

 

             BMI          2023   26.96 kg/m2               Martha Seybold



                          18:33:00                               - External

 

             Systolic blood 2023   130 mm[Hg]                Martha Seybol

d



             pressure     16:29:00                               - External

 

             Diastolic blood 2023   85 mm[Hg]                 Martha Seybo

ld



             pressure     16:29:00                               - External

 

             Heart rate   2023   90 /min                   Martha Seybold



                          16:29:00                               - External

 

             Body temperature 2023   36.67 Milly                 Martha Gillespie

old



                          16:29:00                               - External

 

             Respiratory rate 2023   16 /min                   Martha Shannonyb

old



                          16:29:00                               - External

 

             Body weight  2023   70.761 kg                 Martha Seybold



                          16:29:00                               - External

 

             BMI          2023   25.96 kg/m2               Martha Seybold



                          16:29:00                               - External

 

             Systolic blood 2022-10-11   160 mm[Hg]                Martha Seybol

d



             pressure     18:52:00                               - External

 

             Diastolic blood 2022-10-11   80 mm[Hg]                 Martha Seybo

ld



             pressure     18:52:00                               - External

 

             Heart rate   2022-10-11   100 /min                  Martha Seybold



                          18:52:00                               - External

 

             Body temperature 2022-10-11   36.56 Milly                 Martha Shannonyb

old



                          18:52:00                               - External

 

             Respiratory rate 2022-10-11   18 /min                   Martha Shannonyb

old



                          18:52:00                               - External

 

             Body weight  2022-10-11   71.578 kg                 Martha Seybold



                          18:52:00                               - External

 

             BMI          2022-10-11   26.26 kg/m2               Martha Seybold



                          18:52:00                               - External

 

             Systolic blood 2022   143 mm[Hg]                Martha Seybol

d



             pressure     20:49:00                               

 

             Diastolic blood 2022   88 mm[Hg]                 Martha Seybo

ld



             pressure     20:49:00                               

 

             Heart rate   2022   92 /min                   Martha Seybold



                          20:49:00                               

 

             Body temperature 2022   36.67 Milly                 Martha Shannonyb

old



                          20:49:00                               

 

             Respiratory rate 2022   18 /min                   Martha Shannonyb

old



                          20:49:00                               

 

             Body height  2022   165.1 cm                  Martha Seybold



                          20:49:00                               

 

             Body weight  2022   73.483 kg                 Martha Seybold



                          20:49:00                               

 

             BMI          2022   26.96 kg/m2               Martha Seybold



                          20:49:00                               

 

             Body weight  2021   70.761 kg                 Martha Seybold



                          20:03:00                               

 

             BMI          2021   25.96 kg/m2               Martha Seybold



                          20:03:00                               

 

             Systolic blood 2021   120 mm[Hg]                Martha Seybol

d



             pressure     20:03:00                               

 

             Diastolic blood 2021   82 mm[Hg]                 Martha Seybo

ld



             pressure     20:03:00                               

 

             Heart rate   2021   88 /min                   Martha Seybold



                          20:03:00                               

 

             Body temperature 2021   36.83 Milly                 Martha Shannonyb

old



                          20:03:00                               

 

             Respiratory rate 2021   16 /min                   Martha Shannonyb

old



                          20:03:00                               

 

             Body height  2021   165.1 cm                  Martha Seybold



                          20:03:00                               

 

             Systolic blood 2021-10-04   142 mm[Hg]                Martha Seybol

d



             pressure     20:22:00                               

 

             Diastolic blood 2021-10-04   82 mm[Hg]                 Martha Seybo

ld



             pressure     20:22:00                               

 

             Heart rate   2021-10-04   99 /min                   Martha Mccarthy



                          20:22:00                               

 

             Body temperature 2021-10-04   36.56 Milly                 Martha Gillespie

old



                          20:22:00                               

 

             Respiratory rate 2021-10-04   16 /min                   Martha Gillespie

old



                          20:22:00                               

 

             Body height  2021-10-04   165.1 cm                  Martha Mccarthy



                          20:22:00                               

 

             Body weight  2021-10-04   68.493 kg                 Martha Mccarthy



                          20:22:00                               

 

             BMI          2021-10-04   25.13 kg/m2               Martha Mccarthy



                          20:22:00                               

 

             WEIGHT       2021-03-15   71.668 kg                 



                          18:46:00                               

 

             WEIGHT       2021-03-15   71.668 kg                 



                          18:46:00                               

 

             Heart rate   2023   96 /min                   [facility]



                          07:29:25                               [facility]

 

             Respiratory rate 2023   18 /min                   [facility]

s



                          07:29:25                               [facility]

 

             Oxygen saturation 2023   98 /min                   [facility]

es



             in Arterial blood 07:29:25                               Medical Ce

nter



             by Pulse oximetry                                        

 

             Systolic blood 2023   164 mm[Hg]                [facility]



             pressure     07:28:45                               [facility]

 

             Diastolic blood 2023   99 mm[Hg]                 [facility]



             pressure     07:28:45                               [facility]

 

             Body temperature 2023   36.83 Milly                 [facility]

s



                          07:28:14                               [facility]

 

             Body height  2023   165.1 cm                  [facility]



                          06:12:00                               [facility]

 

             Body weight  2023   73.483 kg                 [facility]



                          06:12:00                               [facility]

 

             BMI          2023   26.96 kg/m2               [facility]



                          06:12:00                               [facility]

 

             Systolic blood 2021-03-15   126 mm[Hg]                [facility]



             pressure     21:01:00                               Medical Center

 

             Diastolic blood 2021-03-15   75 mm[Hg]                 [facility]



             pressure     21:01:00                               [facility]

 

             Heart rate   2021-03-15   80 /min                   [facility]



                          21:01:00                               [facility]

 

             Body temperature 2021-03-15   37 Milly                    [facility]

s



                          21:01:00                               [facility]

 

             Respiratory rate 2021-03-15   18 /min                   [facility]

s



                          21:01:00                               [facility]

 

             Body weight  2021-03-15   71.668 kg                 [facility]



                          18:46:00                               [facility]

 

             BMI          2021-03-15   26.29 kg/m2               [facility]



                          18:46:00                               [facility]

 

             Oxygen saturation 2021-03-15   100 /min                  [facility]

es



             in Arterial blood 18:46:00                               Medical Ce

nter



             by Pulse oximetry                                        







Procedures







                Procedure       Date / Time     Performing Clinician Source



                                Performed                       

 

                COLPORRHAPHY, ANTERIOR 2023 07:29:00 Khari, [facility]

 

                COLPOPEXY, VAGINAL 2023 07:29:00 Khari [facility]



                EXTRAPERITONEAL APPROACH                 [facility]

 

                CYSTOSCOPY      2023 07:29:00 Khari, [facility]

 

                CBC W/PLT COUNT & AUTO 2023 06:41:00 [facility]

 

                BASIC METABOLIC PANEL 2023 06:41:00 Rausch, [facility]

 

                [facility] & [facility]

 

                URINE CULTURE, ROUTINE 2023 20:02:00 Lakesha Macias -



                                                                External

 

                URINALYSIS, COMPLETE 2023 20:02:00 Lakesha Maciasey Seybmuna -



                                                                External

 

                URINALYSIS, COMPLETE 2023 20:02:00 Lakesha Macias Sekelly -



                                                                External

 

                UA/M WITH CULTURE REFLEX 2021-10-04 22:00:00 Clarissa Shipleyey Shari

 

                URINE CULTURE, ROUTINE 2021-10-04 22:00:00 Clarissa Shipley Sekelly

 

                MICROSCOPIC EXAMINATION 2021-10-04 22:00:00 Clarissa Shipley Seybold

 

                CBC WITH        2021-10-04 21:00:00 Clarissa Shipley

old



                DIFFERENTIAL/PLATELET                                 

 

                BASIC METABOLIC PANEL (7) 2021-03-15 19:57:00 Martin, [facility]

 

                TROPONIN I      2021-03-15 19:57:00 Mario [facility]

 

                CBC W/PLT COUNT & AUTO 2021-03-15 19:57:00 Mario [facility]

 

                D-DIMER         2021-03-15 19:57:00 Mario [facility]

 

                CBC W/PLT COUNT & AUTO 2021-03-15 19:57:00 Mario [facility]

 

                XR CHEST 1 VIEW PORTABLE / 2021-03-15 19:30:00 Mario [facility]



                BEDSIDE                                         [facility]

 

                REPORT OF PROCEDURE - 2021-03-15 00:00:00 Provider [facility]



                ENDOSCOPY SCAN                  Scanning        [facility]







Plan of Care







             Planned Activity Planned Date Details      Comments     Source

 

             Future Scheduled 2026   DTAP/TDAP/TD VACCINES              [facility]



             Test         00:00:00     (3 - Td or Tdap) [code              Medic

al Center



                                       = DTAP/TDAP/TD VACCINES              



                                       (3 - Td or Tdap)]              

 

             Future Scheduled 2026   DTAP/TDAP/TD VACCINES              [facility]



             Test         00:00:00     (3 - Td or Tdap) [code              Medic

[facility]



                                       = DTAP/TDAP/TD VACCINES              



                                       (3 - Td or Tdap)]              

 

             Future Scheduled 2025   Screening for malignant              

[facility]



             Test         00:00:00     neoplasm of breast              Medical C

enter



                                       (procedure) [code =              



                                       833592369]                

 

             Future Scheduled 2024   Tobacco Cessation              [facility]



             Test         00:00:00     Counseling and              Medical Cente

r



                                       Screening (12+) [code =              



                                       Tobacco Cessation              



                                       Counseling and              



                                       Screening (12+)]              

 

             Future Scheduled 2023   Screening for malignant              

[facility]



             Test         00:00:00     neoplasm of breast              Medical C

enter



                                       (procedure) [code =              



                                       036205331]                

 

             Future Scheduled 2023   DEPRESSION SCREENING              [facility]



             Test         00:00:00     (12+) [code =              Medical Center



                                       DEPRESSION SCREENING              



                                       (12+)]                    

 

             Future Scheduled 2021   INFLUENZA VACCINE (#1)              [facility]

[facility]



             Test         00:00:00     [code = INFLUENZA              Medical Ce

nter



                                       VACCINE (#1)]              

 

             Future Scheduled 2021   COVID-19 VACCINE (3 -              [facility]

[facility]



             Test         00:00:00     Booster for Moderna              Medical 

Center



                                       series) [code =              



                                       COVID-19 VACCINE (3 -              



                                       Booster for Moderna              



                                       series)]                  

 

             Future Scheduled 2021   DEPRESSION SCREENING              [facility]



             Test         00:00:00     (12+) [code =              Medical Center



                                       DEPRESSION SCREENING              



                                       (12+)]                    

 

             Future Scheduled 2014   SHINGLES VACCINES (1 of              

[facility]



             Test         00:00:00     2) [code = SHINGLES              Medical 

Center



                                       VACCINES (1 of 2)]              

 

             Future Scheduled 2014   SHINGLES VACCINES (1 of              

[facility]



             Test         00:00:00     2) [code = SHINGLES              Medical 

Center



                                       VACCINES (1 of 2)]              

 

             Future Scheduled 2009   Lipid panel (procedure)              

[facility]



             Test         00:00:00     [code = 89356685]              Medical Ce

nter

 

             Future Scheduled 2009   Lipid panel (procedure)              

[facility]



             Test         00:00:00     [code = 65871096]              Medical Ce

nter

 

             Future Scheduled 1985   Screening for malignant              

[facility]



             Test         00:00:00     neoplasm of cervix              Medical C

enter



                                       (procedure) [code =              



                                       383311075]                

 

             Future Scheduled 1985   Screening for malignant              

[facility]



             Test         00:00:00     neoplasm of cervix              Medical C

enter



                                       (procedure) [code =              



                                       197465624]                

 

             Future Scheduled 1982   HEPATITIS C SCREENING              [facility]



             Test         00:00:00     [code = HEPATITIS C              Medical 

Center



                                       SCREENING]                

 

             Future Scheduled 1982   HEPATITIS C SCREENING              [facility]



             Test         00:00:00     [code = HEPATITIS C              Medical 

Center



                                       SCREENING]                

 

             Future Scheduled 1976   COVID-19 VACCINE (1)              [facility]



             Test         00:00:00     [code = COVID-19              Medical Art

ter



                                       VACCINE (1)]              

 

             Future Scheduled 1964   CT Colonography (combo)              

[facility]



             Test         00:00:00     [code = CT Colonography              [facility]



                                       (combo)]                  

 

             Future Scheduled 1964   Screening for malignant              

[facility]



             Test         00:00:00     neoplasm of colon              Medical Ce

nter



                                       (procedure) [code =              



                                       654074051]                

 

             Future Scheduled 1964   Screening for malignant              

[facility]



             Test         00:00:00     neoplasm of colon              Medical Ce

nter



                                       (procedure) [code =              



                                       704974737]                

 

             Future Scheduled 1964   Screening for malignant              

[facility]



             Test         00:00:00     neoplasm of colon              Medical Ce

nter



                                       (procedure) [code =              



                                       822474183]                

 

             Future Scheduled 1964   Screening for malignant              

[facility]



             Test         00:00:00     neoplasm of colon              Medical Ce

nter



                                       (procedure) [code =              



                                       796226353]                

 

             Future Scheduled 1964   Screening for malignant              

[facility]



             Test         00:00:00     neoplasm of colon              Medical Ce

nter



                                       (procedure) [code =              



                                       444062775]                

 

             Future Scheduled 1964   Sigmoidoscopy [code =              [facility]



             Test         00:00:00     Sigmoidoscopy]              Medical Cente

r







Encounters







        Start   End     Encounter Admission Attending Care    Care    Encounter 

Source



        Date/Time Date/Time Type    Type    Clinicians Facility Department ID   

   

 

        2023 Outpatient         MARTHA TELLEZ  [id number]

[id number] [facility]



        08:45:00 08:45:00                 RAKESH                         Seyb

old

 

        2023 Outpatient         MARTHA LUCIA  [id number]

97 Martha



        10:30:00 10:30:00                 MUSTAPHA                         Seybo

ld

 

        2023 Outpatient         MARTHA GREEN  [id number]

18 Martha



        16:00:00 16:00:00                 DELIA                          Seybol

d

 

        2023 Outpatient         MARTHA ADKINS  [id number]

86 Martha



        15:00:00 15:00:00                 PAT                         Seybo

ld

 

        2023 Outpatient         MARTHA LUCIA  [id number]

23 Martha



        00:00:00 00:00:00                 MUSTAPHA                         Seybo

ld

 

        2023 Outpatient                 MARTHA AVILA  [id number]

41 Martha



        00:00:00 00:00:00                                                 Seybol

d

 

        2023 Outpatient         MARTHA KEARNS  [id number]

[id number] Martha



        14:40:00 14:40:00                 RIVAS                         Seybol

d

 

        2023 Outpatient         MARTHA TELLEZ  [id number]

[id number] [facility]



        00:00:00 00:00:00                 RAKESH Gillespie

old

 

        2023 Outpatient         MARTHA LUCIA  [id number]

60 Martha



        00:00:00 00:00:00                 MUSTAPHA aguilar

 

        2023 Outpatient EL      KHARI, [facility]    Surgery [id number]

[facility]



        05:52:00 14:15:00                 MUSTAPHA                         

 

        2023 [facility]         Khari [facility]   [id number]

[id number] [facility]



        05:52:00 14:15:00 Encounter         [facility]

 

        2023 Outpatient         MARTHA LUCIA  [id number]

61 Martha



        00:00:00 00:00:00                 MUSTAPHA Gillespieo

lauren

 

        2023 Outpatient                 MARTHA AVILA  [id number]

62 [facility]



        00:00:00 00:00:00                                                 Seybol

d

 

        2023 Surgery         Khari [facility]   [id number]

[id number] [facility]



        07:30:00 10:00:00                 [facility]

 

        2023 Anesthesia         Dulce Maria Rausch [facility]   [id number]

[id number] 

[facility]



        07:29:00 09:25:00 Event           Alex Malone                      

   [facility]

 

        2023 Outpatient         MARTHA LUCIA  [id number]

29 Martha



        07:30:00 07:30:00                 MUSTAPHA                         Seybo

lauren

 

        2023 Travel                  [facility]   [id number]

 [facility]



        00:00:00 00:00:00                                                 [facility]

 

        2023 Outpatient                 MARTHA AVILA  [id number]

45 Martha



        14:00:00 14:00:00                                                 Seybol

d

 

        2023 Outpatient                 MARTHA AVILA  [id number]

57 Martha



        11:30:00 11:30:00                                                 Seybol

d

 

        2023 Outpatient                 MARTHA AVILA  [id number]

44 [facility]



        09:20:00 09:20:00                                                 Seybol

d

 

        2023 Outpatient         MARTHA GTZ  [id number]

84 Martha



        00:00:00 00:00:00                 RAYNE                          Seybol

d

 

        2023 Outpatient         MARTHA GREEN  [id number]

12 Martha



        00:00:00 00:00:00                 DELIA                          Seybol

d

 

        2023 Outpatient         MARTHA HUERTA  [id number]

[id number] [facility]



        11:30:00 11:30:00                 MILA                           Seybol

d

 

        2023 Outpatient         SAPNA ORDONEZ  [id number]

[id number] [facility]



        13:45:00 13:45:00                                                 Seybol

d

 

        2023 Outpatient                 MARTHA AVILA  [id number]

16 [facility]



        11:40:00 11:40:00                                                 Seybol

d

 

        2023 Outpatient         MARTHA WASHINGTON  [id number]

37 Martha



        00:00:00 00:00:00                 LYNN                           Seybol

d

 

        2023-04-10 2023-04-10 Outpatient         MARTHA GREEN  [id number]

86 Martha



        15:45:00 15:45:00                 DELIA                          Seybol

d

 

        2023 Outpatient EL              [facility]    [facility]    [id number]

678 [facility]



        13:04:46 23:59:00                                                 

 

        2023 [facility]   [id number]

[address] [facility]



        12:00:00 23:59:00 Encounter                                         [facility]

 

        2023 Outpatient         MARTHA WASHINGTON  [id number]

15 Martha



        11:00:00 11:00:00                 LYNN                           Seybol

d

 

        2023 [facility]   [id number]

 [facility]



        00:00:00 00:00:00                                                 [facility]

 

        2023 Outpatient         MAC, EKG- MARTHA AVILA  [id number]

1108 Martha



        15:00:00 15:00:00                                                 Seybol

d

 

        2023 Outpatient         LAKESHA MACIAS MARTHA  MARTHA  1179

[address]201 Martha



        13:40:00 13:40:00                                                 Seybol

d

 

        2023 Outpatient                 MARTHA AVILA  [id number]

52 Martha



        16:00:00 16:00:00                                                 Seybol

d

 

        2023 Outpatient         FELIX MARTHA AVILA  [id number]

49 [place]



        15:00:00 15:00:00                 LYNN                           Seybol

d

 

        2023 Outpatient                 MARTHA AVILA  [id number]

46 Martha



        00:00:00 00:00:00                                                 Seybol

d

 

        2023 Outpatient         MARTHA LUCIA  [id number]

28 Martha



        00:00:00 00:00:00                 MUSTAPHA                         Seybo

ld

 

        2023-02-10 2023-02-10 Outpatient         LAB47   MARTHA AVILA  [id number]

39 Martha



        11:10:00 11:10:00                                                 Seybol

d

 

        2023 Outpatient         LAB47   MARTHA AVILA  [id number]

00 Martha



        15:55:00 15:55:00                                                 Seybol

d

 

        2023 Outpatient         LAKESHA MACIAS MARTHA AVILA  [id number]

04029 Martha



        13:40:00 13:40:00                                                 Seybol

d

 

        2023 Outpatient         MARTHA LUCIA  [id number]

06 Martha



        00:00:00 00:00:00                 MUSTAPHA                         Seybo

ld

 

        2023 Outpatient         MARTHA LUCIA  [id number]

34 Martha



        10:00:00 10:00:00                 MUSTAPHA                         Seybo

ld

 

        2022 Outpatient         MARTHA SIDHU  [id number]

47 Martha



        13:30:00 13:30:00                 AYSENUR                         Seybol

d

 

        2022 Outpatient         OKMARTHA TAN  [id number]

7694 Martha



        14:00:00 14:00:00                 RANDEE                           Seybol

d

 

        2022-10-14 2022-10-14 Outpatient         ULISES MARTHA AVILA  114

[id number] Martha



        00:00:00 00:00:00                 MD JOSEPH                         Seybol

d

 

        2022-10-14 2022-10-14 Outpatient         MARTHA SCHUSTER  [id number]

[id number] Martha



        00:00:00 00:00:00                 CLAIRE                         Seybol

d

 

        2022-10-13 2022-10-13 Outpatient         MARTHA SCHUSTER  1136

51840 Martha



        14:00:00 14:00:00                 CLAIRE                         Seybol

d

 

        2022-10-11 2022-10-11 Outpatient         MARTHA WERNER  [id number]

1157 Martha



        14:00:00 14:00:00                 RANDEE                           Seybol

d

 

        2022-10-06 2022-10-06 Outpatient         MARTHA SCHUSTER  [id number]

01109 Martha



        15:00:00 15:00:00                 CLAIRE                         Seybol

d

 

        2022-10-04 2022-10-04 Outpatient         MARTHA WERNER  [id number]

2596 Martha



        11:15:00 11:15:00                 RANDEE                           Seybol

d

 

        2022 Outpatient         MARTHA TELLEZ  [id number]

3291 Martha



        16:00:00 16:00:00                 RAKESH                         Seyb

old

 

        2022 Office          Juan Diego Wagoner    [id number]

3781 Martha



        14:15:00 14:30:00 Visit           Corrina Tapia 350.1.13.13         Se

ybold



                                        Somogyi         1.2.7.2.686         



                                                        [phone]         



                                                        0               

 

        2022 Outpatient         MARTHA WAGONER  [id number]

090 Martha



        00:00:00 00:00:00                 CORRINA                           Seybol

d

 

        2022 Office          Juan Diego Wagoner    [id number]

1118 Martha



        08:30:00 08:45:00 Visit           Corrina Tapia 350.1.13.13         Se

ybold



                                        Somogyi         1.2.7.2.686         



                                                        [phone]         



                                                        0               

 

        2022 Office          ALEXEI Nicole [id number]

69023 Martha



        16:30:00 17:00:00 Visit           [facility] 350.1.13.13         

Seybold



                                                DIAGNOSTI 1.2.7.2.686         



                                                [facility] [phone]         



                                                        0               

 

        2022 Outpatient         MARTHA CARVAJAL  [id number]

62 Martha



        00:00:00 00:00:00                 SHYANNE Gillespieo

ld

 

        2022 Outpatient                 MARTHA AVILA  [id number]

09 Martha



        15:00:00 15:00:00                                                 Seybol

d

 

        2022 Outpatient         MARTHA CARVAJAL  [id number]

03 Martha



        00:00:00 00:00:00                 SHYANNE aguilar

 

        2022 Outpatient         MARTHA CARVAJAL  [id number]

58 Martha



        00:00:00 00:00:00                 SHYANNE Shannonybo

ld

 

        2022 Outpatient         MARTHA ENGLISH  [id number]

64 Martha



        00:00:00 00:00:00                 REJI Gillespieol

d

 

        2022 Outpatient         MARTHA CARVAJAL  [id number]

20 Martha



        00:00:00 00:00:00                 SHYANNE Gillespieo

ld

 

        2022 Office          SAPNA Prince    [id number]

841 Martha



        16:15:00 16:30:00 Visit           [facility]  350.1.13.13         

Seybold



                                                        1.2.7.2.686         



                                                        [phone]         



                                                        0               

 

        2022 Outpatient                 MARTHA AVILA  [id number]

12 Martha



        13:45:00 13:45:00                                                 Seybol

d

 

        2022 Outpatient         MARTHA CARVAJAL  [id number]

10 Martha



        00:00:00 00:00:00                 SHYANNE aguilar

 

        2022 Outpatient         MARTHA CARVAJAL  [id number]

64 [place]



        11:30:00 11:30:00                 SHYANNE                         Seybo

ld

 

        2022 Outpatient         MARTHA CARVAJAL  [id number]

39 [place]



        00:00:00 00:00:00                 SHYANNE                         Seybo

ld

 

        2022 Outpatient         MARTHA CARVAJAL  [id number]

40 Martha



        14:30:00 14:30:00                 SHYANNE                         Seybo

ld

 

        2022 Outpatient         [facility] MARTHA AVILA  [id number]

[id number] Martha



        00:00:00 00:00:00                 MD JOSEPH                         Seybol

d

 

        2022 Outpatient         MARTHA PRINCE  [id number]

68 Martha



        00:00:00 00:00:00                 MARY                         Seybol

d

 

        2022 Outpatient         MARTHA CARVAJAL  [id number]

20 [place]



        00:00:00 00:00:00                 SHYANNE                         Seybo

ld

 

        2022 Outpatient                 MARTHA AVILA  [id number]

53 Martha



        16:00:00 16:00:00                                                 Seybol

d

 

        2022 Outpatient         LAB   MARTHA AVILA  [id number]

20 [place]



        11:45:00 11:45:00                                                 Seybol

d

 

        2022 Outpatient         MARTHA CARVAJAL  [id number]

35 [place]



        10:45:00 10:45:00                 SHYANNE                         Seybo

ld

 

        2021 Outpatient         MARTHA CARVAJAL  [id number]

08 Martha



        15:45:00 15:45:00                 SHYANNE                         Seybo

ld

 

        2021-12-15 2021-12-15 Outpatient         MARTHA CARVAJAL  [id number]

44 Martha



        00:00:00 00:00:00                 SHYANNE                         Seybo

ld

 

        2021 Outpatient         MARTHA TELLEZ  [id number]

5508 Martha



        10:45:00 10:45:00                 RAKESH                         Seyb

old

 

        2021 Outpatient         MARTHA CADENA  [id number]

66 Martha



        00:00:00 00:00:00                 MAAMEH                         Seybol

d

 

        2021 Office          ALEXEI Gtz [id number]

16825 Martha



        14:00:00 14:15:00 Visit           Rayne Domingo 350.1.13.13         

Seybold



                                                DIAGNOSTI 1.2.7.2.686         



                                                [facility] [phone]         



                                                        0               

 

        2021 Outpatient         ULISES AVILA  104

[id number] Martha



        00:00:00 00:00:00                 MD JOSEPH                         Seybol

d

 

        2021-10-27 2021-10-27 Outpatient         JEREMY FIGUEROA  101

[id number] Martha



        09:15:00 09:15:00                                                 Seybol

d

 

        2021-10-13 2021-10-13 Outpatient         JEREMY FIGUEROA  101

[id number] Martha



        13:15:00 13:15:00                                                 Seybol

d

 

        2021-10-12 2021-10-12 Outpatient                 MARTHA AVILA  [id number]

72 Martha



        08:45:00 08:45:00                                                 Seybol

d

 

        2021-10-04 2021-10-04 Outpatient         LAB47   MARTHA AVILA  [id number]

22 Martha



        15:55:00 15:55:00                                                 Seybol

d

 

        2021-10-04 2021-10-04 Office          JESUS Shipley [id number]

60751 Martha



        14:58:28 15:13:28 Visit           Clarissa         350.1.13.13         Se

ybold



                                                        1.2.7.2.686         



                                                        [phone]         



                                                        0               

 

        2021 Outpatient         INJ,    MARTHA AVILA  [id number]

36 Martha



        16:00:00 16:00:00                 JESUS Gillespieo

ld

 

        2021-09-10 2021-09-10 Outpatient         INJ,    MARTHA AVILA  [id number]

91 Martha



        14:00:00 14:00:00                 JESUS Gillespieo

ld

 

        2021 Outpatient         LAB47   MARTHA AVILA  [id number]

00 Martha



        11:30:00 11:30:00                                                 Seybol

d

 

        2021 Outpatient         LAB47   MARTHA AVILA  [id number]

19 Martha



        13:30:00 13:30:00                                                 Seybol

d

 

        2021 Outpatient         MARTHA ORDONEZ  [id number]

58 [place]



        13:00:00 13:00:00                 PEARLAND                         Seybo

ld

 

        2021 Outpatient         MARTHA GTZ  [id number]

90 Martha



        00:00:00 00:00:00                 RAYNE                          Seybol

d

 

        2021 Outpatient         JEREMY FIGUEROA  101

[id number] Martha



        10:15:00 10:15:00                                                 Seybol

d

 

        2021 Outpatient         MARTHA TELLEZ  [id number]

7183 Martha



        00:00:00 00:00:00                 RAKESH                         Seyb

old

 

        2021 Outpatient         JEREMY FIGUEROA  100

[id number] Martha



        15:00:00 15:00:00                                                 Seybol

d

 

        2021 Outpatient         LAB47   MARTHA AVILA  [id number]

21 Martha



        12:00:00 12:00:00                                                 Seybol

d

 

        2021 Outpatient         LAB47   MARTHA AVILA  [id number]

24 Martha



        09:55:00 09:55:00                                                 Seybol

d

 

        2021 Outpatient                 MARTHA AVILA  [id number]

10 Martha



        09:35:00 09:35:00                                                 Seybol

d

 

        2021 Outpatient         JEREMY FIGUEROA  101

[id number] Martha



        09:00:00 09:00:00                                                 Seybol

d

 

        2021 Outpatient         ULISES AVILA  101

[id number] Martha



        00:00:00 00:00:00                 MD JOSEPH                         Seybol

d

 

        2021 Outpatient         MARTHA GTZ  [id number]

82 Martha



        11:15:00 11:15:00                 RAYNE                          Seybol

d

 

        2021-03-15 2021-03-15 Emergency ER      [facility]   [id number]

[id number] [facility]



        18:59:00 21:28:00                 [facility]

 

        2021-03-15 2021-03-15 Emergency ER              [facility]    Emergency [id number]

7895 [facility]



        18:30:00 18:30:00                                                 







Results







           Test Description Test Time  Test Comments Results    Result Comments 

Source









                    BASIC METABOLIC PANEL 2023 07:25:03 









                      Test Item  Value      Reference Range Interpretation Comme

nts









             SODIUM (BEAKER) (test 142 meq/L    136-145                   



             code = 381)                                         

 

             POTASSIUM (BEAKER) 3.9 meq/L    3.5-5.1                   



             (test code = 379)                                        

 

             CHLORIDE (BEAKER) (test 107 meq/L                        



             code = 382)                                         

 

             CO2 (BEAKER) (test code 28 meq/L     22-29                     



             = 355)                                              

 

             BLOOD UREA NITROGEN 12 mg/dL     7-21                      



             (BEAKER) (test code =                                        



             354)                                                

 

             CREATININE (BEAKER) 0.85 mg/dL   0.57-1.25                 



             (test code = 358)                                        

 

             GLUCOSE RANDOM (BEAKER) 131 mg/dL           H            



             (test code = 652)                                        

 

             CALCIUM (BEAKER) (test 9.4 mg/dL    8.4-10.2                  



             code = 697)                                         

 

             EGFR (BEAKER) (test 79 mL/min/1.73 sq                            In

terpretation of eGFR values



             code = 1092) m                                      Stage Descripti

on Result G1



                                                                 Normal or high 

>=90  G2 Mildly



                                                                 decreased 60-89

 G3a Mildly to



                                                                 moderately 45-5

9 G3b Moderately



                                                                 to severely 30-

44 G4 Severly



                                                                 decreased 15-29

 G5 Kidney



                                                                 failure <15Repo

rted eGFR is



                                                                 based on the CK

D-EPI 



                                                                 equation that d

oes not use a



                                                                 race coefficien

tEstimated GFR is



                                                                 not as accurate

 as Creatinine



                                                                 Clearance in pr

edicting



                                                                 glomerular filt

ration rate.



                                                                 Estimated GFR i

s not applicable



                                                                 for dialysis renate joseph



 ID - ADMINCBC W/PLT COUNT &amp; AUTO [id number] 07:05:40





             Test Item    Value        Reference Range Interpretation Comments

 

             WHITE BLOOD CELL COUNT (BEAKER) 6.1 K/ L     3.5-10.5              

    



             (test code = 775)                                        

 

             RED BLOOD CELL COUNT (BEAKER) 4.37 M/ L    3.93-5.22               

  



             (test code = 761)                                        

 

             HEMOGLOBIN (BEAKER) (test code = 12.9 GM/DL   11.2-15.7            

     



             410)                                                

 

             HEMATOCRIT (BEAKER) (test code = 40.0 %       34.1-44.9            

     



             411)                                                

 

             MEAN CORPUSCULAR VOLUME (BEAKER) 92 fL        79-95                

     



             (test code = 753)                                        

 

             MEAN CORPUSCULAR HEMOGLOBIN 29.5 pg      25.6-32.2                 



             (BEAKER) (test code = 751)                                        

 

             MEAN CORPUSCULAR HEMOGLOBIN CONC 32.3 GM/DL   32.2-35.5            

     



             (BEAKER) (test code = 752)                                        

 

             RED CELL DISTRIBUTION WIDTH 12.4 %       11.7-14.4                 



             (BEAKER) (test code = 412)                                        

 

             PLATELET COUNT (BEAKER) (test 243 K/CU MM  150-450                 

  



             code = 756)                                         

 

             MEAN PLATELET VOLUME (BEAKER) 9.6 fL       9.4-12.3                

  



             (test code = 754)                                        

 

             NUCLEATED RED BLOOD CELLS 0 /100 WBC   0-0                       



             (BEAKER) (test code = 413)                                        

 

             NEUTROPHILS RELATIVE PERCENT 42 %                                  

 



             (BEAKER) (test code = 429)                                        

 

             LYMPHOCYTES RELATIVE PERCENT 47 %                                  

 



             (BEAKER) (test code = 430)                                        

 

             MONOCYTES RELATIVE PERCENT 6 %                                    



             (BEAKER) (test code = 431)                                        

 

             EOSINOPHILS RELATIVE PERCENT 4 %                                   

 



             (BEAKER) (test code = 432)                                        

 

             BASOPHILS RELATIVE PERCENT 1 %                                    



             (BEAKER) (test code = 437)                                        

 

             NEUTROPHILS ABSOLUTE COUNT 2.60 K/ L    1.56-6.13                 



             (BEAKER) (test code = 670)                                        

 

             LYMPHOCYTES ABSOLUTE COUNT 2.85 K/ L    1.18-3.74                 



             (BEAKER) (test code = 414)                                        

 

             MONOCYTES ABSOLUTE COUNT (BEAKER) 0.39 K/ L    0.24-0.36    H      

      



             (test code = 415)                                        

 

             EOSINOPHILS ABSOLUTE COUNT 0.24 K/ L    0.04-0.36                 



             (BEAKER) (test code = 416)                                        

 

             BASOPHILS ABSOLUTE COUNT (BEAKER) 0.03 K/ L    0.01-0.08           

      



             (test code = 417)                                        

 

             IMMATURE GRANULOCYTES-RELATIVE 0.30 %       0.00-1.00              

   



             PERCENT (BEAKER) (test code =                                      

  



             2801)                                               



UA/M WITH CULTURE REFLEX2021-10-06 09:08:00





             Test Item    Value        Reference Range Interpretation Comments

 

             SPECIFIC GRAVITY              1.005-1.030               



             (test code = 2965-2)                                        

 

             PH (test code =              5.0-7.5                   



             5803-2)                                             

 

             URINE-COLOR (test Yellow       Yellow                    



             code = 5778-6)                                        

 

             APPEARANCE (test code Clear        Clear                     



             = 5767-9)                                           

 

             WBC ESTERASE (test 1+           Negative     A            



             code = 5799-2)                                        

 

             PROTEIN (test code = Negative     Negative/Trace              



             25925-1)                                            

 

             GLUCOSE (test code = Negative     Negative                  



             2349-9)                                             

 

             KETONES (test code = Negative     Negative                  



             2514-8)                                             

 

             OCCULT BLOOD (test Negative     Negative                  



             code = 5794-3)                                        

 

             BILIRUBIN (test code Negative     Negative                  



             = 5770-3)                                           

 

             UROBILINOGEN,SEMI-QN 0.2 mg/dL    0.2-1.0                   



             (test code = 90706-6)                                        

 

             NITRITE, URINE (test Negative     Negative                  



             code = 5802-4)                                        

 

             MICROSCOPIC  See below:                             Microscopic was



             EXAMINATION (test                                        indicated 

and was



             code = 67295-2)                                        performed.

 

             URINALYSIS REFLEX                                        This speci

men has



             (test code = 513197)                                        reflexe

d to a



                                                                 Urine Culture.

 

             MORRIS (test code = MORRIS) [facility] results                           



                          reported in                            



                          Eastern Time.                           



                          [facility]                           



                          Information:SRC                           



                          :Urine ? ? ? ?                           



                          ? ? ? ? ?[facility]                           



                          Source of                              



                          Specimen:Urine                           

 

             Lab Interpretation Abnormal                               



             (test code = 96768-3)                                        



Martha SeyboldMICROSCOPIC [id number] 09:08:00





             Test Item    Value        Reference Range Interpretation Comments

 

             WBC (test code = 30        See_Comment  A             [Automated



             5821-4)                                             message] The



                                                                 system which



                                                                 generated this



                                                                 result transmit

alexis



                                                                 reference range

: 0



                                                                 - 5 /hpf. The



                                                                 reference range



                                                                 was not used to



                                                                 interpret this



                                                                 result as



                                                                 normal/abnormal

.

 

             RBC (test code = None seen    See_Comment                [Automated



             11171-8)                                            message] The



                                                                 system which



                                                                 generated this



                                                                 result transmit

alexis



                                                                 reference range

: 0



                                                                 - 2 /hpf. The



                                                                 reference range



                                                                 was not used to



                                                                 interpret this



                                                                 result as



                                                                 normal/abnormal

.

 

             EPITHELIAL CELLS (NON >10          See_Comment  A             [Auto

mated



             RENAL) (test code =                                        message]

 The



             5787-7)                                             system which



                                                                 generated this



                                                                 result transmit

alexis



                                                                 reference range

: 0



                                                                 - 10 /hpf. The



                                                                 reference range



                                                                 was not used to



                                                                 interpret this



                                                                 result as



                                                                 normal/abnormal

.

 

             CASTS (test code = None seen    None seen /lpf              



             85012-3)                                            

 

             BACTERIA (test code = None seen    None seen/Few              



             5769-5)                                             

 

             MORRIS (test code = MORRIS) [facility] results                           



                          reported in                            



                          [facility] Time.                           



                          [facility]                           



                          Information:[facility]                           



                          :Urine ? ? ? ?                           



                          ? ? ? ? ?[facility]                           



                          Source of                              



                          Specimen:Urine                           

 

             Lab Interpretation Abnormal                               



             (test code = 35996-0)                                        



Martha Corrigan CULTURE, ROUTINE2021-10-06 09:08:00





             Test Item    Value        Reference Range Interpretation Comments

 

             RESULT 1 (test code = No growth                              



             630-4)                                              

 

             MORRIS (test code = MORRIS) [facility] results                           



                          reported in Eastern                           



                          Time. [facility]                           



                          Information:SRC:Urine ?                           



                          ? ? ? ? ? ? ? ?[facility]                           



                          Source of                              



                          Specimen:Urine                           



Martha JoyceC WITH DIFFERENTIAL/PLATELET2021-10-05 09:08:00





             Test Item    Value        Reference Range Interpretation Comments

 

             WHITE BLOOD CELL              See_Comment                [Automated



             (WBC) COUNT (test                                        message] T

he



             code = 6690-2)                                        system which



                                                                 generated this



                                                                 result transmit

alexis



                                                                 reference range

:



                                                                 3.4 - 10.8



                                                                 x10E3/uL. The



                                                                 reference range



                                                                 was not used to



                                                                 interpret this



                                                                 result as



                                                                 normal/abnormal

.

 

             RED BLOOD CELL (RBC)              See_Comment                [Autom

ated



             COUNT (test code =                                        message] 

The



             789-8)                                              system which



                                                                 generated this



                                                                 result transmit

alexis



                                                                 reference range

:



                                                                 3.77 - 5.28



                                                                 x10E6/uL. The



                                                                 reference range



                                                                 was not used to



                                                                 interpret this



                                                                 result as



                                                                 normal/abnormal

.

 

             HEMOGLOBIN (test code 12.1 g/dL    11.1-15.9                 



             = 718-7)                                            

 

             HEMATOCRIT (test code 38.6 %       34.0-46.6                 



             = 4544-3)                                           

 

             MCV (test code = 88 fL        79-97                     



             787-2)                                              

 

             MCH (test code = 27.6 pg      26.6-33.0                 



             785-6)                                              

 

             MCHC (test code = 31.3 g/dL    31.5-35.7    L            



             786-4)                                              

 

             RDW (test code = 13.7 %       11.7-15.4                 



             788-0)                                              

 

             PLATELETS (test code              See_Comment                [Autom

ated



             = 777-3)                                            message] The



                                                                 system which



                                                                 generated this



                                                                 result transmit

alexis



                                                                 reference range

:



                                                                 150 - 450



                                                                 x10E3/uL. The



                                                                 reference range



                                                                 was not used to



                                                                 interpret this



                                                                 result as



                                                                 normal/abnormal

.

 

             NEUTROPHILS (test 44 %         Not Estab.                



             code = 770-8)                                        

 

             LYMPHS (test code = 43 %         Not Estab.                



             736-9)                                              

 

             MONOCYTES (test code 9 %          Not Estab.                



             = 5905-5)                                           

 

             EOS (test code = 3 %          Not Estab.                



             713-8)                                              

 

             BASOS (test code = 1 %          Not Estab.                



             706-2)                                              

 

             NEUTROPHILS               See_Comment                [Automated



             (ABSOLUTE) (test code                                        messag

e] The



             = 751-8)                                            system which



                                                                 generated this



                                                                 result transmit

alexis



                                                                 reference range

:



                                                                 1.4 - 7.0



                                                                 x10E3/uL. The



                                                                 reference range



                                                                 was not used to



                                                                 interpret this



                                                                 result as



                                                                 normal/abnormal

.

 

             LYMPHS (ABSOLUTE)              See_Comment                [Automate

d



             (test code = 731-0)                                        message]

 The



                                                                 system which



                                                                 generated this



                                                                 result transmit

alexis



                                                                 reference range

:



                                                                 0.7 - 3.1



                                                                 x10E3/uL. The



                                                                 reference range



                                                                 was not used to



                                                                 interpret this



                                                                 result as



                                                                 normal/abnormal

.

 

             MONOCYTES(ABSOLUTE)              See_Comment                [Automa

alexis



             (test code = 742-7)                                        message]

 The



                                                                 system which



                                                                 generated this



                                                                 result transmit

alexis



                                                                 reference range

:



                                                                 0.1 - 0.9



                                                                 x10E3/uL. The



                                                                 reference range



                                                                 was not used to



                                                                 interpret this



                                                                 result as



                                                                 normal/abnormal

.

 

             EOS (ABSOLUTE) (test              See_Comment                [Autom

ated



             code = 711-2)                                        message] The



                                                                 system which



                                                                 generated this



                                                                 result transmit

alexis



                                                                 reference range

:



                                                                 0.0 - 0.4



                                                                 x10E3/uL. The



                                                                 reference range



                                                                 was not used to



                                                                 interpret this



                                                                 result as



                                                                 normal/abnormal

.

 

             BASO (ABSOLUTE) (test              See_Comment                [Auto

mated



             code = 704-7)                                        message] The



                                                                 system which



                                                                 generated this



                                                                 result transmit

alexis



                                                                 reference range

:



                                                                 0.0 - 0.2



                                                                 x10E3/uL. The



                                                                 reference range



                                                                 was not used to



                                                                 interpret this



                                                                 result as



                                                                 normal/abnormal

.

 

             IMMATURE GRANULOCYTES 0 %          Not Estab.                



             (test code = 75081-8)                                        

 

             IMMATURE GRANS (ABS)              See_Comment                [Autom

ated



             (test code = 03095-1)                                        messag

e] The



                                                                 system which



                                                                 generated this



                                                                 result transmit

alexis



                                                                 reference range

:



                                                                 0.0 - 0.1



                                                                 x10E3/uL. The



                                                                 reference range



                                                                 was not used to



                                                                 interpret this



                                                                 result as



                                                                 normal/abnormal

.

 

             MORRIS (test code = MORRIS) [facility] results                           



                          reported in                            



                          [place]astern Time.                           



                          [facility]                           



                          Information:[facility]                           



                          :Blood,                                



                          venous*Venipunc                           



                          ture ? ? ? ? ?                           



                          ? ? ? ? ? ? [facility]                           



                          Source of                              



                          Specimen:Blood,                           



                          venous*Venipunc                           

 

             Lab Interpretation Abnormal                               



             (test code = 83154-7)                                        



Martha MccarthyD-dimer, quantitative2021-03-15 20:27:00





             Test Item    Value        Reference Range Interpretation Comments

 

             D-Dimer, Quant (test 0.93         See_Comment  H            Final I

nformation



             code = 22957-7)                                        (Auto Output

)



                                                                 [Automated



                                                                 message] The



                                                                 system which



                                                                 generated this



                                                                 result



                                                                 transmitted



                                                                 reference range

:



                                                                 <0.50 MG/L FEU.



                                                                 The reference



                                                                 range was not



                                                                 used to interpr

et



                                                                 this result as



                                                                 normal/abnormal

.

 

             MORRIS (test code = REGARDING D-DIMER                           



             MORRIS)         RESULTS: The 98%                           



                          NPV (Negative                           



                          Predictive Value)                           



                          for DVT/PE                             



                          exclusion is 0.50                           



                          mg/L FEU as                            



                          suggested by the                           



                           and                           



                          as approved by the                           



                          FDA.                                   

 

             Lab Interpretation Abnormal                               



             (test code =                                        



             08994-4)                                            



[facility]





             Test Item    Value        Reference Range Interpretation Comments

 

             D-DIMER QUANTITATIVE 0.93 MG/L FEU <0.50        H            Final 

Information



             (BEAKER) (test code =                                        (Auto 

Output)



             671)                                                



REGARDING D-DIMER RESULTS: The 98% NPV (Negative Predictive Value) for DVT/PE 
exclusion is 0.50 mg/LFEU as suggested by the  and as approved by 
the FDA.Troponin -03-15 20:26:00





             Test Item    Value        Reference Range Interpretation Comments

 

             Troponin I (test code = <0.03        0.00-0.15                 



             54718-2)                                            

 

             MORRIS (test code = MORRIS) Troponin I (TnI)                           



                          levels must be                           



                          interpreted in the                           



                          context of the                           



                          presenting symptoms                           



                          and the clinical                           



                          findings. Elevated                           



                          TnI levels indicate                           



                          myocardial damage,                           



                          but are not specific                           



                          for ischemic heart                           



                          disease. Elevated TnI                           



                          levels are seen in                           



                          patients with other                           



                          cardiac conditions                           



                          (including                             



                          myocarditis and                           



                          congestive heart                           



                          failure), and slight                           



                          TnI elevations occur                           



                          in patients with                           



                          other conditions,                           



                          including sepsis,                           



                          renal failure,                           



                          acidosis, acute                           



                          neurological disease,                           



                          and persistent                           



                          tachyarrhythmia.Opera                           



                          tor ID - [id number]                           

 

             Lab Interpretation (test Normal                                 



             code = 98404-6)                                        



[facility]





             Test Item    Value        Reference Range Interpretation Comments

 

             TROPONIN I (BEAKER) (test code = 397) < ng/mL      0.00-0.15       

          



Troponin I (TnI) levels must be interpreted in the context of the presenting 
symptoms and the clinical findings. Elevated TnI levels indicate myocardial 
damage, but are not specific for ischemic heart disease. Elevated TnI levels are
seen in patients with other cardiac conditions (including myocarditis and 
congestive heart failure), and slight TnI elevations occur in patients with 
other conditions, including sepsis, renal failure, acidosis, acute neurological 
disease, and persistent tachyarrhythmia. ID - [id number], CHEST, 1 
VIEW, NON DEPT2021-03-15 20:22:00Reason for exam:-&gt;Chest Pain
************************************************************[facility] : 1965 Sex: 
F************************************************************FINAL REPORT 
PATIENT ID: [id number] History: Chest pain. Comparison: 2018 Findings: A 
single view of the chest is submitted. The cardiomediastinal contours are 
unremarkable. There is no focal consolidation, pneumothorax, large pleural 
effusion or evidence of overt pulmonary edema. There is no acute bony 
abnormality. Impression: No acute abnormality. Signed: [facility] 
Verified Date/Time:03/15/2021 20:22:14 Electronically signed by: HARIKA MEDRANO M.D. on 03/15/2021 08:22 PMBasic metabolic panel2021-03-15 20:19:00





             Test Item    Value        Reference Range Interpretation Comments

 

             Sodium (test code = 140 meq/L    135-148                   



             2951-2)                                             

 

             Potassium (test code = 4.1 meq/L    3.6-5.5                   Speci

men



             2823-3)                                             moderately



                                                                 hemolyzed

 

             Chloride (test code = 106 meq/L                        



             2075-0)                                             

 

             CO2 (test code = 24 meq/L     -29                     



             -9)                                             

 

             BUN (test code = 8 mg/dL      10-26        L            



             3094-0)                                             

 

             Creatinine (test code 0.83 mg/dL   0.50-1.20                 Specim

en



             = 2160-0)                                           moderately



                                                                 hemolyzed

 

             Glucose (test code = 85 mg/dL                         



             2345-7)                                             

 

             Calcium (test code = 9.1 mg/dL    8.5-10.5                  



             31583-5)                                            

 

             EGFR (test code = 86           mL/min/1.73 sq m              ESTIMA

ALEXIS GFR IS



             33914-3)                                            NOT AS ACCURATE



                                                                 AS CREATININE



                                                                 CLEARANCE IN



                                                                 PREDICTING



                                                                 GLOMERULAR



                                                                 FILTRATION RATE

.



                                                                 ESTIMATED GFR I

S



                                                                 NOT APPLICABLE



                                                                 FOR DIALYSIS



                                                                 PATIENTS.

 

             MORRIS (test code = MORRIS)  ID -                           



                          [id number]                           



                          r ID -                                 



                          [id number]                           



                          r ID -                                 



                          [id number]                           



                          r ID -                                 



                          [id number]                           



                          r ID -                                 



                          [id number]                           



                          r ID -                                 



                          [id number]                           



                          r ID -                                 



                          [id number]                           



                          r ID -                                 



                          [id number]                           



                          r ID -                                 



                          [id number]                           



                          r ID -                                 



                          [id number]                           



                          r ID -                                 



                          [id number]                           



                          r ID - [id number]                           

 

             Lab Interpretation Abnormal                               



             (test code = 63180-4)                                        



[facility] METABOLIC PANEL2021-03-15 20:19:00





             Test Item    Value        Reference Range Interpretation Comments

 

             SODIUM (BEAKER) 140 meq/L    135-148                   



             (test code = 381)                                        

 

             POTASSIUM (BEAKER) 4.1 meq/L    3.6-5.5                   Specimen 

moderately



             (test code = 379)                                        hemolyzed

 

             CHLORIDE (BEAKER) 106 meq/L                        



             (test code = 382)                                        

 

             CO2 (BEAKER) (test 24 meq/L     20-29                     



             code = 355)                                         

 

             BLOOD UREA NITROGEN 8 mg/dL      10-26        L            



             (BEAKER) (test code                                        



             = 354)                                              

 

             CREATININE (BEAKER) 0.83 mg/dL   0.50-1.20                 Specimen

 moderately



             (test code = 358)                                        hemolyzed

 

             GLUCOSE RANDOM 85 mg/dL                         



             (BEAKER) (test code                                        



             = 652)                                              

 

             CALCIUM (BEAKER) 9.1 mg/dL    8.5-10.5                  



             (test code = 697)                                        

 

             EGFR (BEAKER) (test 86 mL/min/1.73                           ESTIMA

ALEXIS GFR IS



             code = 1092) sq m                                   NOT AS ACCURATE

 AS



                                                                 CREATININE



                                                                 CLEARANCE IN



                                                                 PREDICTING



                                                                 GLOMERULAR



                                                                 FILTRATION RATE

.



                                                                 ESTIMATED GFR I

S



                                                                 NOT APPLICABLE 

FOR



                                                                 DIALYSIS PATIEN

TS.



 ID - [id number] ID - [id number] ID - [id number] ID - 
[id number] ID - [id number] ID - [id number] ID - 
[id number] ID - [id number] ID - [id number] ID - 
[id number] ID - [id number] ID - [id number] with platelet count + 
automated diff2021-03-15 20:08:00





             Test Item    Value        Reference Range Interpretation Comments

 

             WBC (test code = 6690-2) 7.8          See_Comment                [A

utomated message]



                                                                 The system [employer]



                                                                 generated this 

result



                                                                 transmitted ref

erence



                                                                 range: 4.0 - 10

.0



                                                                 K/L. The refe

rence



                                                                 range was not u

sed to



                                                                 interpret this 

result



                                                                 as normal/abnor

mal.

 

             RBC (test code = 789-8) 4.22         See_Comment                [Au

tomated message]



                                                                 The system [employer]



                                                                 generated this 

result



                                                                 transmitted ref

erence



                                                                 range: 4.00 - 5

.00



                                                                 M/L. The refe

rence



                                                                 range was not u

sed to



                                                                 interpret this 

result



                                                                 as normal/abnor

mal.

 

             MCHC (test code = 786-4) 29.9         See_Comment  L             [A

utomated message]



                                                                 The system [employer]



                                                                 generated this 

result



                                                                 transmitted ref

erence



                                                                 range: 32.0 - 3

6.0



                                                                 GM/DL. The refe

rence



                                                                 range was not u

sed to



                                                                 interpret this 

result



                                                                 as normal/abnor

mal.

 

             Hematocrit (test code = 37.1 %       36.0-46.0                 



             4544-3)                                             

 

             MCV (test code = 787-2) 87.9 fL      82.0-99.0                 

 

             MCH (test code = 785-6) 26.3 pg      27.0-33.0    L            

 

             RDW (test code = 788-0) 15.1 %       12.0-15.0    H            

 

             Platelets (test code = 294          See_Comment                [Aut

omated message]



             777-3)                                              The system [employer]



                                                                 generated this 

result



                                                                 transmitted ref

erence



                                                                 range: 150 - 43

0 K/CU



                                                                 MM. The referen

ce



                                                                 range was not u

sed to



                                                                 interpret this 

result



                                                                 as normal/abnor

mal.

 

             MPV (test code = 9.8 fL       6.0-11.5                  



             28573-7)                                            

 

             nRBC (test code = 413) 0            See_Comment                [Aut

omated message]



                                                                 The system [employer]



                                                                 generated this 

result



                                                                 transmitted ref

erence



                                                                 range: 0 - 0 /1

00



                                                                 WBC. The refere

nce



                                                                 range was not u

sed to



                                                                 interpret this 

result



                                                                 as normal/abnor

mal.

 

             % Neutros (test code = 47 %                                   



             429)                                                

 

             % Lymphs (test code = 40 %                                   



             430)                                                

 

             % Monos (test code = 9 %                                    



             431)                                                

 

             % Eos (test code = 432) 3 %                                    

 

             % Baso (test code = 437) 0 %                                    

 

             # Neutros (test code = 3.68         See_Comment                [Aut

omated message]



             670)                                                The system [employer]



                                                                 generated this 

result



                                                                 transmitted ref

erence



                                                                 range: 1.80 - 8

.00



                                                                 K/L. The refe

rence



                                                                 range was not u

sed to



                                                                 interpret this 

result



                                                                 as normal/abnor

mal.

 

             # Lymphs (test code = 3.11         See_Comment                [Auto

mated message]



             414)                                                The system [employer]



                                                                 generated this 

result



                                                                 transmitted ref

erence



                                                                 range: 1.48 - 4

.50



                                                                 K/L. The refe

rence



                                                                 range was not u

sed to



                                                                 interpret this 

result



                                                                 as normal/abnor

mal.

 

             # Monos (test code = 0.73         See_Comment                [Autom

ated message]



             415)                                                The system [employer]



                                                                 generated this 

result



                                                                 transmitted ref

erence



                                                                 range: 0.00 - 1

.30



                                                                 K/L. The refe

rence



                                                                 range was not u

sed to



                                                                 interpret this 

result



                                                                 as normal/abnor

mal.

 

             # Eos (test code = 416) 0.21         See_Comment                [Au

tomated message]



                                                                 The system [employer]



                                                                 generated this 

result



                                                                 transmitted ref

erence



                                                                 range: 0.00 - 0

.50



                                                                 K/L. The refe

rence



                                                                 range was not u

sed to



                                                                 interpret this 

result



                                                                 as normal/abnor

mal.

 

             # Baso (test code = 417) 0.03         See_Comment                [A

utomated message]



                                                                 The system [employer]



                                                                 generated this 

result



                                                                 transmitted ref

erence



                                                                 range: 0.00 - 0

.20



                                                                 K/L. The refe

rence



                                                                 range was not u

sed to



                                                                 interpret this 

result



                                                                 as normal/abnor

mal.

 

             Immature     0 %          0-0                       



             Granulocytes-Relative                                        



             (test code = 2801)                                        

 

             Lab Interpretation (test Abnormal                               



             code = 00041-0)                                        



[facility] W/PLT COUNT &amp; AUTO DIFFERENTIAL2021-03-15 
20:08:00





             Test Item    Value        Reference Range Interpretation Comments

 

             WHITE BLOOD CELL COUNT (BEAKER) 7.8 K/ L     4.0-10.0              

    



             (test code = 775)                                        

 

             RED BLOOD CELL COUNT (BEAKER) 4.22 M/ L    4.00-5.00               

  



             (test code = 761)                                        

 

             HEMOGLOBIN (BEAKER) (test code = 11.1 GM/DL   12.0-15.5    L       

     



             410)                                                

 

             HEMATOCRIT (BEAKER) (test code = 37.1 %       36.0-46.0            

     



             411)                                                

 

             MEAN CORPUSCULAR VOLUME (BEAKER) 87.9 fL      82.0-99.0            

     



             (test code = 753)                                        

 

             MEAN CORPUSCULAR HEMOGLOBIN 26.3 pg      27.0-33.0    L            



             (BEAKER) (test code = 751)                                        

 

             MEAN CORPUSCULAR HEMOGLOBIN CONC 29.9 GM/DL   32.0-36.0    L       

     



             (BEAKER) (test code = 752)                                        

 

             RED CELL DISTRIBUTION WIDTH 15.1 %       12.0-15.0    H            



             (BEAKER) (test code = 412)                                        

 

             PLATELET COUNT (BEAKER) (test 294 K/CU MM  150-430                 

  



             code = 756)                                         

 

             MEAN PLATELET VOLUME (BEAKER) 9.8 fL       6.0-11.5                

  



             (test code = 754)                                        

 

             NUCLEATED RED BLOOD CELLS 0 /100 WBC   0-0                       



             (BEAKER) (test code = 413)                                        

 

             NEUTROPHILS RELATIVE PERCENT 47 %                                  

 



             (BEAKER) (test code = 429)                                        

 

             LYMPHOCYTES RELATIVE PERCENT 40 %                                  

 



             (BEAKER) (test code = 430)                                        

 

             MONOCYTES RELATIVE PERCENT 9 %                                    



             (BEAKER) (test code = 431)                                        

 

             EOSINOPHILS RELATIVE PERCENT 3 %                                   

 



             (BEAKER) (test code = 432)                                        

 

             BASOPHILS RELATIVE PERCENT 0 %                                    



             (BEAKER) (test code = 437)                                        

 

             NEUTROPHILS ABSOLUTE COUNT 3.68 K/ L    1.80-8.00                 



             (BEAKER) (test code = 670)                                        

 

             LYMPHOCYTES ABSOLUTE COUNT 3.11 K/ L    1.48-4.50                 



             (BEAKER) (test code = 414)                                        

 

             MONOCYTES ABSOLUTE COUNT (BEAKER) 0.73 K/ L    0.00-1.30           

      



             (test code = 415)                                        

 

             EOSINOPHILS ABSOLUTE COUNT 0.21 K/ L    0.00-0.50                 



             (BEAKER) (test code = 416)                                        

 

             BASOPHILS ABSOLUTE COUNT (BEAKER) 0.03 K/ L    0.00-0.20           

      



             (test code = 417)                                        

 

             IMMATURE GRANULOCYTES-RELATIVE 0 %          0-0                    

   



             PERCENT (BEAKER) (test code =                                      

  



             2801)                                               



CT, CHEST WITH IV CONTRAST- PE TEST [id number] 16:31:00Reason for exam:-
&gt;SHORTNESS OF BREATHpt complains of SOB that has been going on since being 
released from [facility] in [facility] the patient pregnant?-&gt;NoWhat is 
the patient's sedation requirement?-&gt;No SedationFINAL REPORT PATIENT ID: 
[id number] CT CHEST WITH CONTRAST (PE PROTOCOL) History provided: Acute chestpain,
suspected pulmonary embolism, shortness of breath TECHNIQUE: Spiral CT cuts were
performed through the chest during rapid IV contrast administration. FINDINGS: 
Lungs are fully expanded and clear.Heart is normal in size. No pleural or 
pericardial effusion. No hilar or mediastinal mass or lymphadenopathy. Pulmonary
arteries opacify satisfactorily and show no evidence of emboli. Thoracic aorta 
shows no evidence of aneurysm or dissection. IMPRESSION: Normal examination. 
COMMENT: This exam was performed according to our departmental dose-optimization
program, which includes automated exposure control, adjustment of the mA and/or 
kV according to patient size and/or use of iterative reconstructiontechnique. 
Signed: [facility] Verified Date/Time: 2018 16:31:11 Reading 
Location: [facility] Diagnostic Imaging Reading Room - [facility] [phone] Electronically
signed by: POPEYE KENT on 2018 04:31 PMB-TYPE NATRIURETIC FACTOR (BNP)
2018 15:34:00





             Test Item    Value        Reference Range Interpretation Comments

 

             B-TYPE NATRIURETIC PEPTIDE (BEAKER) 8 pg/mL      0-100             

        



             (test code = 700)                                        



CREATINE KINASE (CK), TOTAL AND [id number] 15:30:00





             Test Item    Value        Reference Range Interpretation Comments

 

             CREATINE KINASE TOTAL (BEAKER) 119 U/L                       

   



             (test code = 380)                                        

 

             CREATINE KINASE-MB (BEAKER) (test 0.7 ng/mL    0.0-4.9             

      



             code = 750)                                         

 

             CREATINE KINASE-MB INDEX (BEAKER) 0.6 %                            

      



             (test code = 395)                                        



CK-MB Reference Range:&lt;5 Normal5-10 Borderline&gt;10 AbnormalTROPONIN I
2018 15:30:00





             Test Item    Value        Reference Range Interpretation Comments

 

             TROPONIN I (BEAKER) (test code = 397) < ng/mL      0.00-0.03       

          



Effective 2017: Reference Range ChangeNew: 0.00-0.03 ng/mL (Previous: 
0.00-0.15 ng/mL)Reason for change: Updated to reflect the current testing 
platformTroponin I (TnI) levels must be interpretedin the context of the 
presenting symptoms and the clinical findings. Elevated TnI levels indicate matt
cardial damage, but are not specific for ischemic heart disease. Elevated TnI 
levels are seen in patients with other cardiac conditions (including myocarditis
and congestive heart failure), and slight TnI elevations occur in patients with 
other conditions, including sepsis, renal failure, acidosis, acute neurological 
disease, and persistent tachyarrythmia.[id number] 15:24:00





             Test Item    Value        Reference Range Interpretation Comments

 

             D-DIMER QUANTITATIVE (BEAKER) 0.96 MG/L FEU <0.50        H         

   



             (test code = 671)                                        



REGARDING D-DIMER RESULTS: The 98% NPV (Negative Predictive Value) for DVT/PE 
exclusion is 0.50 mg/LFEU as suggested by the  and as approved by 
the FDA.BASIC METABOLIC [id number] 15:20:00





             Test Item    Value        Reference Range Interpretation Comments

 

             SODIUM (BEAKER) 142 meq/L    135-148                   



             (test code = 381)                                        

 

             POTASSIUM (BEAKER) 3.8 meq/L    3.6-5.5                   



             (test code = 379)                                        

 

             CHLORIDE (BEAKER) 110 meq/L           H            



             (test code = 382)                                        

 

             CO2 (BEAKER) (test 21 meq/L     20-29                     



             code = 355)                                         

 

             BLOOD UREA NITROGEN 12 mg/dL     10-26                     



             (BEAKER) (test code                                        



             = 354)                                              

 

             CREATININE (BEAKER) 0.91 mg/dL   0.50-1.20                 



             (test code = 358)                                        

 

             GLUCOSE RANDOM 157 mg/dL           H            



             (BEAKER) (test code                                        



             = 652)                                              

 

             CALCIUM (BEAKER) 10.7 mg/dL   8.5-10.5     H            



             (test code = 697)                                        

 

             EGFR (BEAKER) (test 78 mL/min/1.73                           ESTIMA

ALEXIS GFR IS



             code = 1092) sq m                                   NOT AS ACCURATE

 AS



                                                                 CREATININE



                                                                 CLEARANCE IN



                                                                 PREDICTING



                                                                 GLOMERULAR



                                                                 FILTRATION RATE

.



                                                                 ESTIMATED GFR I

S



                                                                 NOT APPLICABLE 

FOR



                                                                 DIALYSIS PATIEN

TS.



CBC W/PLT COUNT &amp; AUTO [id number] 15:12:00





             Test Item    Value        Reference Range Interpretation Comments

 

             WHITE BLOOD CELL COUNT (BEAKER) 5.8 K/ L     4.0-10.0              

    



             (test code = 775)                                        

 

             RED BLOOD CELL COUNT (BEAKER) 4.28 M/ L    4.00-5.00               

  



             (test code = 761)                                        

 

             HEMOGLOBIN (BEAKER) (test code = 12.6 GM/DL   12.0-15.0            

     



             410)                                                

 

             HEMATOCRIT (BEAKER) (test code = 39.2 %       36.0-45.0            

     



             411)                                                

 

             MEAN CORPUSCULAR VOLUME (BEAKER) 91.6 fL      82.0-99.0            

     



             (test code = 753)                                        

 

             MEAN CORPUSCULAR HEMOGLOBIN 29.4 pg      27.0-33.0                 



             (BEAKER) (test code = 751)                                        

 

             MEAN CORPUSCULAR HEMOGLOBIN CONC 32.1 GM/DL   32.0-36.0            

     



             (BEAKER) (test code = 752)                                        

 

             RED CELL DISTRIBUTION WIDTH 13.1 %       10.3-14.2                 



             (BEAKER) (test code = 412)                                        

 

             PLATELET COUNT (BEAKER) (test 356 K/CU MM  150-430                 

  



             code = 756)                                         

 

             MEAN PLATELET VOLUME (BEAKER) 9.6 fL       6.5-10.5                

  



             (test code = 754)                                        

 

             NUCLEATED RED BLOOD CELLS 0 /100 WBC   0-0                       



             (BEAKER) (test code = 413)                                        

 

             NEUTROPHILS RELATIVE PERCENT 82 %                                  

 



             (BEAKER) (test code = 429)                                        

 

             LYMPHOCYTES RELATIVE PERCENT 16 %                                  

 



             (BEAKER) (test code = 430)                                        

 

             MONOCYTES RELATIVE PERCENT 1 %                                    



             (BEAKER) (test code = 431)                                        

 

             EOSINOPHILS RELATIVE PERCENT 0 %                                   

 



             (BEAKER) (test code = 432)                                        

 

             BASOPHILS RELATIVE PERCENT 0 %                                    



             (BEAKER) (test code = 437)                                        

 

             NEUTROPHILS ABSOLUTE COUNT 4.80 K/ L    1.80-8.00                 



             (BEAKER) (test code = 670)                                        

 

             LYMPHOCYTES ABSOLUTE COUNT 0.96 K/ L    1.48-4.50    L            



             (BEAKER) (test code = 414)                                        

 

             MONOCYTES ABSOLUTE COUNT (BEAKER) 0.08 K/ L    0.00-1.30           

      



             (test code = 415)                                        

 

             EOSINOPHILS ABSOLUTE COUNT 0.00 K/ L    0.00-0.50                 



             (BEAKER) (test code = 416)                                        

 

             BASOPHILS ABSOLUTE COUNT (BEAKER) 0.00 K/ L    0.00-0.20           

      



             (test code = 417)                                        



RAD, CHEST, 1 VIEW, NON [id number] 14:40:00Reason for exam:-&gt;SHORTNESS OF
BREATHpt complains of SOB that has been going on since being released from 
[facility] in [facility] the patient pregnant?-&gt;NoShould this be 
performed at the bedside?-&gt;YesFINAL REPORT PATIENT ID: [id number] Chest, AP 
view. History: Shortness of breath. Comparison: None available. Discussion: The 
cardiomediastinal silhouette and pulmonary vasculature are within normal limits.
The lungs are clear without evidence of consolidation or effusion. There are no 
acute osseous abnormalities. The soft tissues are unremarkable. IMPRESSION: No 
acute cardiopulmonary abnormality. Signed: Yogesh Hall MDReport Verified 
Date/Time: 2018 14:40:58 Reading Location: [facility] Mammo Reading Room 
Electronically signed by: YOGESH HALL M.D. on 2018 02:40 PM

## 2023-04-25 NOTE — EDPHYS
Physician Documentation                                                                           

 [facility]                                                                 

Name: Shadi Fall                                                                               

Age: 57 yrs                                                                                       

Sex: Female                                                                                       

: 1965                                                                                   

MRN: [id number]                                                                                   

Arrival Date: 2023                                                                          

Time: 14:15                                                                                       

Account#: [id number]                                                                            

Bed 3                                                                                             

Private MD:                                                                                       

ED Physician Daniel Barry                                                                         

HPI:                                                                                              

                                                                                             

17:34 This 57 yrs old Black Female presents to ER via Ambulatory with complaints of Urinary   ms3 

      Problem.                                                                                    

17:34 57-year-old female with no past medical history presents for urinary retention. Patient ms3 

      states she had bladder lift surgery on Thursday. Patient states she had to have a           

      catheter placed secondary to her inability to void. Patient states this morning she         

      developed 9/10 and a pressure in her suprapubic area. Patient denies alleviating or         

      inciting factors. Patient states she feels the need to urinate..                            

                                                                                                  

Historical:                                                                                       

- Allergies:                                                                                      

14:49 No Known Allergies;                                                                     ap3 

- PMHx:                                                                                           

14:49 None;                                                                                   ap3 

- PSHx:                                                                                           

14:49 bladder lift; hysterectomy-;                                                        ap3 

                                                                                                  

- Immunization history:: Client reports receiving the 2nd dose of the Covid vaccine.              

- Social history:: Smoking status: Patient denies any tobacco usage or history of.                

                                                                                                  

                                                                                                  

ROS:                                                                                              

17:34  Positive for Inability to urinate.                                                   ms3 

17:34 Constitutional: Negative for fever, and chills. Cardiovascular: Negative for chest          

      pain, and palpitations. Respiratory: Negative for shortness of breath, cough, wheezing,     

      and pleuritic chest pain, Abdomen/GI: Negative for abdominal pain, nausea, vomiting,        

      diarrhea, and constipation.                                                                 

17:34 All other systems are negative.                                                             

                                                                                                  

Exam:                                                                                             

17:34 Constitutional:  This is a well developed, well nourished patient who is awake, alert,  ms3 

      and in no acute distress. Head/Face:  Normocephalic, atraumatic. Neck:  Trachea             

      midline, no cervical lymphadenopathy.  Supple, full range of motion without nuchal          

      rigidity, or vertebral point tenderness.  No Meningismus. Chest/axilla:  Normal chest       

      wall appearance and motion.  Nontender with no deformity.   Cardiovascular:  Regular        

      rate and rhythm with a normal S1 and S2.  No gallops, murmurs, or rubs.  Normal PMI, no     

      JVD.  No pulse deficits. Respiratory:  Lungs have equal breath sounds bilaterally,          

      clear to auscultation and percussion.  No rales, rhonchi or wheezes noted.  No              

      increased work of breathing, no retractions or nasal flaring.                               

17:34 Skin:  Warm, dry with normal turgor.  Normal color with no rashes, no lesions, and no       

      evidence of cellulitis.                                                                     

17:34 Abdomen/GI: Inspection: abdomen appears normal, Bowel sounds: normal, Palpation:            

      moderate abdominal tenderness, in the suprapubic area.                                      

                                                                                                  

Vital Signs:                                                                                      

14:46  / 105; Pulse 121; Resp 17; Temp 98.5; Pulse Ox 100% ; Weight 72.57 kg; Height 5  ap3 

      ft. 5 in. ; Pain 9/10;                                                                      

17:59  / 87; Pulse 97; Resp 16; Pulse Ox 98% ;                                          iw  

14:46 Body Mass Index 26.63 (72.57 kg, 165.1 cm)                                              ap3 

14:46 Pain Scale: Adult                                                                       ap3 

                                                                                                  

MDM:                                                                                              

14:58 Patient medically screened.                                                             ms3 

17:34 Differential diagnosis: urinary tract infection, Urinary retention. Data reviewed:      ms3 

      vital signs, nurses notes, lab test result(s), and as a result, I will discharge            

      patient. Counseling: I had a detailed discussion with the patient and/or guardian           

      regarding: the historical points, exam findings, and any diagnostic results supporting      

      the discharge/admit diagnosis, lab results, the need for outpatient follow up, to           

      return to the emergency department if symptoms worsen or persist or if there are any        

      questions or concerns that arise at home. ED course: Discussed urinalysis results with      

      patient. Patient to follow-up with her gynecologist in 2 to 3 days. Patient understands     

      and agrees with plan. All questions were answered. Return precautions discussed include     

      worsening symptoms, or any other concerns.                                                  

                                                                                                  

                                                                                             

14:58 Order name: Urinalysis w/ reflexes; Complete Time: 17:31                                ms3 

                                                                                             

14:58 Order name: Fan Leg Bag; Complete Time: 17:11                                         ms3 

                                                                                                  

Administered Medications:                                                                         

No medications were administered                                                                  

                                                                                                  

                                                                                                  

Disposition Summary:                                                                              

23 17:32                                                                                    

Discharge Ordered                                                                                 

      Location: Home                                                                          ms3 

      Condition: Stable                                                                       ms3 

      Diagnosis                                                                                   

        - Retention of urine, unspecified                                                     ms3 

      Followup:                                                                               ms3 

        - With: Private Physician                                                                  

        - When: 2 - 3 days                                                                         

        - Reason: Recheck today's complaints                                                       

      Discharge Instructions:                                                                     

        - Discharge Summary Sheet                                                             ms3 

        - Acute Urinary Retention, Female                                                     ms3 

      Forms:                                                                                      

        - Medication Reconciliation Form                                                      ms3 

        - Thank You Letter                                                                    ms3 

        - Antibiotic Education                                                                ms3 

        - Prescription Opioid Use                                                             ms3 

Signatures:                                                                                       

Dispatcher [employer]                           Nery Bonilla RN                    RN   ap3                                                  

Daniel Barry DO DO   ms3                                                  

                                                                                                  

**************************************************************************************************